# Patient Record
Sex: FEMALE | Race: WHITE | NOT HISPANIC OR LATINO | URBAN - METROPOLITAN AREA
[De-identification: names, ages, dates, MRNs, and addresses within clinical notes are randomized per-mention and may not be internally consistent; named-entity substitution may affect disease eponyms.]

---

## 2017-05-12 ENCOUNTER — ON CAMPUS - OUTPATIENT (OUTPATIENT)
Dept: URBAN - METROPOLITAN AREA HOSPITAL 108 | Facility: HOSPITAL | Age: 62
End: 2017-05-12

## 2017-05-12 DIAGNOSIS — R10.13 EPIGASTRIC PAIN: ICD-10-CM

## 2017-05-12 DIAGNOSIS — K21.9 GASTRO-ESOPHAGEAL REFLUX DISEASE WITHOUT ESOPHAGITIS: ICD-10-CM

## 2017-05-12 DIAGNOSIS — R07.89 OTHER CHEST PAIN: ICD-10-CM

## 2017-05-12 PROCEDURE — 99205 OFFICE O/P NEW HI 60 MIN: CPT | Performed by: PHYSICIAN ASSISTANT

## 2017-05-13 ENCOUNTER — INPATIENT HOSPITAL (OUTPATIENT)
Dept: URBAN - METROPOLITAN AREA HOSPITAL 107 | Facility: HOSPITAL | Age: 62
End: 2017-05-13

## 2017-05-13 DIAGNOSIS — K44.9 DIAPHRAGMATIC HERNIA WITHOUT OBSTRUCTION OR GANGRENE: ICD-10-CM

## 2017-05-13 DIAGNOSIS — R07.9 CHEST PAIN, UNSPECIFIED: ICD-10-CM

## 2017-05-13 DIAGNOSIS — K21.9 GASTRO-ESOPHAGEAL REFLUX DISEASE WITHOUT ESOPHAGITIS: ICD-10-CM

## 2017-05-13 DIAGNOSIS — K29.70 GASTRITIS, UNSPECIFIED, WITHOUT BLEEDING: ICD-10-CM

## 2017-05-13 DIAGNOSIS — R10.13 EPIGASTRIC PAIN: ICD-10-CM

## 2017-05-13 PROCEDURE — 43239 EGD BIOPSY SINGLE/MULTIPLE: CPT | Performed by: INTERNAL MEDICINE

## 2018-04-17 ENCOUNTER — INPATIENT HOSPITAL (OUTPATIENT)
Dept: URBAN - METROPOLITAN AREA HOSPITAL 107 | Facility: HOSPITAL | Age: 63
End: 2018-04-17

## 2018-04-17 DIAGNOSIS — R94.5 ABNORMAL RESULTS OF LIVER FUNCTION STUDIES: ICD-10-CM

## 2018-04-17 DIAGNOSIS — R12 HEARTBURN: ICD-10-CM

## 2018-04-17 DIAGNOSIS — R19.5 OTHER FECAL ABNORMALITIES: ICD-10-CM

## 2018-04-17 PROCEDURE — 99223 1ST HOSP IP/OBS HIGH 75: CPT | Performed by: INTERNAL MEDICINE

## 2018-04-18 ENCOUNTER — INPATIENT HOSPITAL (OUTPATIENT)
Dept: URBAN - METROPOLITAN AREA HOSPITAL 107 | Facility: HOSPITAL | Age: 63
End: 2018-04-18

## 2018-04-18 DIAGNOSIS — K62.5 HEMORRHAGE OF ANUS AND RECTUM: ICD-10-CM

## 2018-04-18 DIAGNOSIS — R94.5 ABNORMAL RESULTS OF LIVER FUNCTION STUDIES: ICD-10-CM

## 2018-04-18 DIAGNOSIS — K21.9 GASTRO-ESOPHAGEAL REFLUX DISEASE WITHOUT ESOPHAGITIS: ICD-10-CM

## 2018-04-18 PROCEDURE — 99231 SBSQ HOSP IP/OBS SF/LOW 25: CPT | Performed by: PHYSICIAN ASSISTANT

## 2018-05-16 ENCOUNTER — OFFICE (OUTPATIENT)
Dept: URBAN - METROPOLITAN AREA CLINIC 75 | Facility: CLINIC | Age: 63
End: 2018-05-16

## 2018-05-16 VITALS
DIASTOLIC BLOOD PRESSURE: 74 MMHG | SYSTOLIC BLOOD PRESSURE: 130 MMHG | HEIGHT: 65 IN | WEIGHT: 144 LBS | HEART RATE: 57 BPM

## 2018-05-16 DIAGNOSIS — K21.9 GASTRO-ESOPHAGEAL REFLUX DISEASE WITHOUT ESOPHAGITIS: ICD-10-CM

## 2018-05-16 DIAGNOSIS — K76.0 FATTY (CHANGE OF) LIVER, NOT ELSEWHERE CLASSIFIED: ICD-10-CM

## 2018-05-16 DIAGNOSIS — Z12.11 ENCOUNTER FOR SCREENING FOR MALIGNANT NEOPLASM OF COLON: ICD-10-CM

## 2018-05-16 DIAGNOSIS — R13.10 DYSPHAGIA, UNSPECIFIED: ICD-10-CM

## 2018-05-16 DIAGNOSIS — K22.70 BARRETT'S ESOPHAGUS WITHOUT DYSPLASIA: ICD-10-CM

## 2018-05-16 PROCEDURE — 99214 OFFICE O/P EST MOD 30 MIN: CPT | Performed by: NURSE PRACTITIONER

## 2018-05-16 RX ORDER — OMEPRAZOLE 40 MG/1
40 CAPSULE, DELAYED RELEASE ORAL
Qty: 30 | Refills: 6 | Status: COMPLETED
Start: 2018-05-16 | End: 2022-04-01

## 2019-07-19 ENCOUNTER — OFFICE (OUTPATIENT)
Dept: URBAN - METROPOLITAN AREA CLINIC 75 | Facility: CLINIC | Age: 64
End: 2019-07-19

## 2019-07-19 VITALS
HEART RATE: 80 BPM | DIASTOLIC BLOOD PRESSURE: 78 MMHG | HEIGHT: 65 IN | WEIGHT: 154 LBS | SYSTOLIC BLOOD PRESSURE: 138 MMHG

## 2019-07-19 DIAGNOSIS — K22.70 BARRETT'S ESOPHAGUS WITHOUT DYSPLASIA: ICD-10-CM

## 2019-07-19 DIAGNOSIS — K76.0 FATTY (CHANGE OF) LIVER, NOT ELSEWHERE CLASSIFIED: ICD-10-CM

## 2019-07-19 DIAGNOSIS — Z79.01 LONG TERM (CURRENT) USE OF ANTICOAGULANTS: ICD-10-CM

## 2019-07-19 DIAGNOSIS — K21.9 GASTRO-ESOPHAGEAL REFLUX DISEASE WITHOUT ESOPHAGITIS: ICD-10-CM

## 2019-07-19 DIAGNOSIS — K59.00 CONSTIPATION, UNSPECIFIED: ICD-10-CM

## 2019-07-19 DIAGNOSIS — Z12.11 ENCOUNTER FOR SCREENING FOR MALIGNANT NEOPLASM OF COLON: ICD-10-CM

## 2019-07-19 DIAGNOSIS — Z86.010 PERSONAL HISTORY OF COLONIC POLYPS: ICD-10-CM

## 2019-07-19 PROCEDURE — 99214 OFFICE O/P EST MOD 30 MIN: CPT | Performed by: NURSE PRACTITIONER

## 2019-07-19 RX ORDER — DEXLANSOPRAZOLE 60 MG/1
120 CAPSULE, DELAYED RELEASE ORAL
Qty: 180 | Refills: 3 | Status: ACTIVE
Start: 2019-07-19

## 2021-04-09 ENCOUNTER — OFFICE (OUTPATIENT)
Dept: URBAN - METROPOLITAN AREA CLINIC 75 | Facility: CLINIC | Age: 66
End: 2021-04-09

## 2021-04-09 VITALS
DIASTOLIC BLOOD PRESSURE: 76 MMHG | SYSTOLIC BLOOD PRESSURE: 116 MMHG | TEMPERATURE: 97.1 F | HEART RATE: 66 BPM | OXYGEN SATURATION: 97 % | HEIGHT: 65 IN | WEIGHT: 164 LBS

## 2021-04-09 DIAGNOSIS — K22.70 BARRETT'S ESOPHAGUS WITHOUT DYSPLASIA: ICD-10-CM

## 2021-04-09 DIAGNOSIS — K59.00 CONSTIPATION, UNSPECIFIED: ICD-10-CM

## 2021-04-09 DIAGNOSIS — R74.8 ABNORMAL LEVELS OF OTHER SERUM ENZYMES: ICD-10-CM

## 2021-04-09 DIAGNOSIS — Z86.010 PERSONAL HISTORY OF COLONIC POLYPS: ICD-10-CM

## 2021-04-09 DIAGNOSIS — K21.9 GASTRO-ESOPHAGEAL REFLUX DISEASE WITHOUT ESOPHAGITIS: ICD-10-CM

## 2021-04-09 PROCEDURE — 99214 OFFICE O/P EST MOD 30 MIN: CPT | Performed by: NURSE PRACTITIONER

## 2021-04-09 RX ORDER — DEXLANSOPRAZOLE 60 MG/1
120 CAPSULE, DELAYED RELEASE ORAL
Qty: 180 | Refills: 3 | Status: ACTIVE
Start: 2019-07-19

## 2021-10-21 ENCOUNTER — OFFICE VISIT (OUTPATIENT)
Dept: FAMILY MEDICINE CLINIC | Facility: CLINIC | Age: 66
End: 2021-10-21

## 2021-10-21 VITALS
OXYGEN SATURATION: 97 % | WEIGHT: 165 LBS | HEIGHT: 65 IN | BODY MASS INDEX: 27.49 KG/M2 | HEART RATE: 64 BPM | DIASTOLIC BLOOD PRESSURE: 88 MMHG | SYSTOLIC BLOOD PRESSURE: 152 MMHG

## 2021-10-21 DIAGNOSIS — E11.65 TYPE 2 DIABETES MELLITUS WITH HYPERGLYCEMIA, WITHOUT LONG-TERM CURRENT USE OF INSULIN (HCC): ICD-10-CM

## 2021-10-21 DIAGNOSIS — K21.9 GASTROESOPHAGEAL REFLUX DISEASE, UNSPECIFIED WHETHER ESOPHAGITIS PRESENT: ICD-10-CM

## 2021-10-21 DIAGNOSIS — Z23 INFLUENZA VACCINE NEEDED: ICD-10-CM

## 2021-10-21 DIAGNOSIS — E78.2 MIXED HYPERLIPIDEMIA: ICD-10-CM

## 2021-10-21 DIAGNOSIS — Z23 NEED FOR PNEUMOCOCCAL VACCINE: Primary | ICD-10-CM

## 2021-10-21 DIAGNOSIS — E03.9 HYPOTHYROIDISM, UNSPECIFIED TYPE: ICD-10-CM

## 2021-10-21 DIAGNOSIS — I10 ESSENTIAL HYPERTENSION: ICD-10-CM

## 2021-10-21 DIAGNOSIS — Z78.0 POSTMENOPAUSAL: ICD-10-CM

## 2021-10-21 PROCEDURE — 90686 IIV4 VACC NO PRSV 0.5 ML IM: CPT

## 2021-10-21 PROCEDURE — 99204 OFFICE O/P NEW MOD 45 MIN: CPT

## 2021-10-21 PROCEDURE — G0008 ADMIN INFLUENZA VIRUS VAC: HCPCS

## 2021-10-21 RX ORDER — ISOSORBIDE MONONITRATE 30 MG/1
30 TABLET, EXTENDED RELEASE ORAL DAILY
COMMUNITY

## 2021-10-21 RX ORDER — ASPIRIN 81 MG/1
81 TABLET ORAL DAILY
COMMUNITY

## 2021-10-21 RX ORDER — LEVOTHYROXINE SODIUM 0.07 MG/1
75 TABLET ORAL DAILY
COMMUNITY
End: 2022-01-25 | Stop reason: SDUPTHER

## 2021-10-21 RX ORDER — ALIROCUMAB 75 MG/ML
INJECTION, SOLUTION SUBCUTANEOUS
COMMUNITY
End: 2022-01-03 | Stop reason: SDUPTHER

## 2021-10-21 RX ORDER — DEXLANSOPRAZOLE 60 MG/1
60 CAPSULE, DELAYED RELEASE ORAL DAILY
COMMUNITY

## 2021-10-21 RX ORDER — METOPROLOL SUCCINATE 25 MG/1
25 TABLET, EXTENDED RELEASE ORAL DAILY
COMMUNITY
End: 2022-01-03 | Stop reason: SDUPTHER

## 2021-10-21 NOTE — PROGRESS NOTES
"Subjective   Gerda Bolden is a 65 y.o. female. Presents today as a New patient here to establish care and f/u on DM    Chief Complaint   Patient presents with   • Annual Exam       History of Present Illness    Patient here as a new patient establish care  States he used to see primary care provider at [facility] on [address] but does not want to go there anymore.  Hx. HTN, HLD, CAD, GERD, Hypothyroidism and hard of hearing.     CAD  Previous history of chest pains with consequent heart cath and stenting x2 on 8/2016 and 12/2016.  Sees cardiologist Dr. Arnett. Per records, cont to have chest pains and underwent another heart cath in 2018   Was on blood thinners but not anymore. Is taking daily aspirin.   Denies recent chest pain, shortness of breath, edema, palpitations, dizziness.  Last saw cardiologist in 3/21. States will see cardiologist next month.  No hx MI/CVA     HLD  Hx of elevated cholesterol, LDL and liver enzymes. Did not tolerate statins and was placed on Praluent which states is tolerating well.   Lipids from 4/07/21- Triglycerides> 192; Cholesterol> 247; LDL> 157; HDL> 51    Hypothyroidism  Also history of hypothyroidism.  Takes levothyroxine with no issues.  Denies any weight changes.     DM  States was told a month ago that she had diabetes.    Was a started on Metformin 500 twice a day.  Has been taking for couple weeks no side effects.  Does complain of polyphagia, denies polydipsia, and polyuria.  Does have occasional tingling and burning and warmth on bottom of feet. Denies feeling dizzy, clammy.   States unhealthy diet. Poor dietary choices including fast foods and a \"lot of soda\" drinks.   Has equipment at home to check sugars and has been noting BG in 200s.   No recent diabetic eye exam. No vision changes  Recent A1c on 9/16/2021> 7.2- Last a1c in 2019> 5.6    GERD  On Dexilant, tolerating well.  Sees GI- Dr Ryan Flowers for NAFLD  Colonoscopy will do with GI- has a liver US coming up soon as " well.       Hx of postmenopausal vag bleeding  Underwent endometrial biopsy & hysteroscopy and cervical mass removal in 2018 w Dr. Mcdonald with [facility]  Has not had follow ups.   Denies further vag bleeding, pelvic pain.     HM  Mammogram last week- DXP - normal   Covid vaccine UTD  CRC screen- will get with GI   DEXA scan- will order today  Needs Flu vaccine today  Defers Pneumococcal vaccine- states had recently    Factor V family Hx- wants screened- will address next appointment with labs.       Review of Systems   Constitutional: Positive for fatigue. Negative for appetite change, fever and unexpected weight change.   Eyes: Negative for visual disturbance.   Respiratory: Negative.    Cardiovascular: Negative.    Gastrointestinal: Negative.    Endocrine: Negative.    Musculoskeletal: Negative.        There is no problem list on file for this patient.    Past Medical History:   Diagnosis Date   • Diabetes mellitus (HCC)    • GERD (gastroesophageal reflux disease)    • Hyperlipidemia    • Hypertension    • Hypothyroidism      Past Surgical History:   Procedure Laterality Date   • BREAST SURGERY Right    • CARDIAC CATHETERIZATION     • DILATATION AND CURETTAGE       Family History   Problem Relation Age of Onset   • No Known Problems Mother    • Cancer Sister      Social History     Socioeconomic History   • Marital status:    Tobacco Use   • Smoking status: Former Smoker     Packs/day: 1.50     Types: Cigarettes     Quit date: 2017     Years since quittin.8   • Smokeless tobacco: Never Used   Vaping Use   • Vaping Use: Never used   Substance and Sexual Activity   • Alcohol use: Never   • Drug use: Never   • Sexual activity: Defer       Allergies   Allergen Reactions   • Statins Other (See Comments)     Muscle aches       Current Outpatient Medications on File Prior to Visit   Medication Sig Dispense Refill   • Alirocumab (Praluent) 75 MG/ML solution auto-injector Inject  under the skin into the  "appropriate area as directed.     • aspirin 81 MG EC tablet Take 81 mg by mouth Daily.     • dexlansoprazole (DEXILANT) 60 MG capsule Take 60 mg by mouth Daily.     • isosorbide mononitrate (IMDUR) 30 MG 24 hr tablet Take 30 mg by mouth Daily.     • levothyroxine (SYNTHROID, LEVOTHROID) 75 MCG tablet Take 75 mcg by mouth Daily.     • metFORMIN (GLUCOPHAGE) 500 MG tablet Take 500 mg by mouth 2 (Two) Times a Day With Meals.     • metoprolol succinate XL (TOPROL-XL) 25 MG 24 hr tablet Take 25 mg by mouth Daily.       No current facility-administered medications on file prior to visit.       Objective   Vitals:    10/21/21 1305   BP: 152/88   Pulse: 64   SpO2: 97%   Weight: 74.8 kg (165 lb)   Height: 163.8 cm (64.5\")     Body mass index is 27.88 kg/m².  Physical Exam  Constitutional:       Appearance: Normal appearance.   HENT:      Head: Normocephalic and atraumatic.      Right Ear: Decreased hearing noted.      Left Ear: Decreased hearing noted.   Neck:      Thyroid: No thyroid mass, thyromegaly or thyroid tenderness.   Cardiovascular:      Rate and Rhythm: Normal rate and regular rhythm.      Pulses: Normal pulses.      Heart sounds: Normal heart sounds. No murmur heard.      Pulmonary:      Effort: Pulmonary effort is normal.      Breath sounds: Normal breath sounds.   Musculoskeletal:      Right lower leg: No edema.      Left lower leg: No edema.   Lymphadenopathy:      Cervical: No cervical adenopathy.   Neurological:      General: No focal deficit present.      Mental Status: She is alert and oriented to person, place, and time.   Psychiatric:         Mood and Affect: Mood normal.         Behavior: Behavior normal.         Thought Content: Thought content normal.         Judgment: Judgment normal.         Assessment/Plan   Diagnoses and all orders for this visit:      1. Type 2 diabetes mellitus with hyperglycemia, without long-term current use of insulin (HCC)       - Continue Metformin       - Labs not due- " will check in December (3 months after last     A1c)        - Advised to get an eye exam ASAP    2. Influenza vaccine needed      -     FluLaval/Fluarix/Fluzone >6 Months (6300-3013)    3. Postmenopausal  -     DEXA Bone Density Axial    4. Essential hypertension      - BP not at goal today.       - Work on a healthy diet.  Limit salt and fatty foods.  Add  exercise to her routine.  Recommendation is 150 minutes  of moderate exercise per week if you cannot do that, at  least walk 30 minutes 3-4 times per week.       - Has appointment with cardiology next month.       - If you start having chest pain severe headaches shortness o breath or dizziness go to the ED.    6. Hypothyroidism, unspecified type      - last TSH in 4/21 1.40     - cont current tx.     7. Gastroesophageal reflux disease, unspecified whether esophagitis present     - stable. Cont current tx.     8. Mixed hyperlipidemia    - Lipids on 4/21 elevated    - Advised on healthy diet.    - wants to discuss further recommendations w cardiology     HM  Mammogram last week- DXP - normal   Covid vaccine UTD  CRC screen- will get with GI   DEXA scan- will order today  Needs Flu vaccine today  Defers Pneumococcal vaccine- states had recently    Factor V family Hx- wants screened- will address next appointment with labs.        -Follow up: In December 2021.    This document is intended for medical expert use only. Reading of this document by patients and/or patient's family without participating medical staff guidance may result in misinterpretation and unintended morbidity.  Any interpretation of such data is the responsibility of the patient and/or family member responsible for the patient in concert with their primary or specialist providers, not to be left for sources of online searches such as GetMyRx, [employer] or similar queries. Relying on these approaches to knowledge may result in misinterpretation, misguided goals of care and even death should patients or  family members try recommendations outside of the realm of professional medical care in a supervised way.     Please allow 3-5 business days for recommendations based on new results     Go to the ER for any possible lifethreatening symptoms such as chest pain or shortness of air.      I personally spent 45 minutes reviewing the chart before the visit, time with the patient, and time documenting the visit.    Dictated utilizing Dragon dictation:  Much of this encounter note is an electronic transcription/translation of spoken language to printed text. The electronic translation of spoken language may permit erroneous, or at times, nonsensical words or phrases to be inadvertently transcribed; Although I have reviewed the note for such errors, some may still exist.

## 2022-01-03 ENCOUNTER — OFFICE VISIT (OUTPATIENT)
Dept: FAMILY MEDICINE CLINIC | Facility: CLINIC | Age: 67
End: 2022-01-03

## 2022-01-03 VITALS
BODY MASS INDEX: 27.31 KG/M2 | WEIGHT: 160 LBS | SYSTOLIC BLOOD PRESSURE: 142 MMHG | OXYGEN SATURATION: 97 % | DIASTOLIC BLOOD PRESSURE: 74 MMHG | HEIGHT: 64 IN | HEART RATE: 60 BPM

## 2022-01-03 DIAGNOSIS — E11.65 TYPE 2 DIABETES MELLITUS WITH HYPERGLYCEMIA, WITHOUT LONG-TERM CURRENT USE OF INSULIN: ICD-10-CM

## 2022-01-03 DIAGNOSIS — I10 ESSENTIAL HYPERTENSION: Primary | ICD-10-CM

## 2022-01-03 DIAGNOSIS — Z11.59 NEED FOR HEPATITIS C SCREENING TEST: ICD-10-CM

## 2022-01-03 DIAGNOSIS — R25.2 MUSCLE CRAMPS: ICD-10-CM

## 2022-01-03 DIAGNOSIS — E03.9 HYPOTHYROIDISM, UNSPECIFIED TYPE: ICD-10-CM

## 2022-01-03 DIAGNOSIS — E78.2 MIXED HYPERLIPIDEMIA: ICD-10-CM

## 2022-01-03 PROCEDURE — 99214 OFFICE O/P EST MOD 30 MIN: CPT

## 2022-01-03 RX ORDER — ALIROCUMAB 75 MG/ML
INJECTION, SOLUTION SUBCUTANEOUS
COMMUNITY
End: 2022-01-20 | Stop reason: SDUPTHER

## 2022-01-03 RX ORDER — METOPROLOL TARTRATE 50 MG/1
TABLET, FILM COATED ORAL
COMMUNITY
Start: 2021-11-30 | End: 2022-01-03

## 2022-01-03 RX ORDER — METOPROLOL SUCCINATE 25 MG/1
25 TABLET, EXTENDED RELEASE ORAL DAILY
COMMUNITY

## 2022-01-03 NOTE — PROGRESS NOTES
"Subjective   Gerda Bolden is a 66 y.o. female. Who presents to follow up      History of Present Illness     Saw cardiology in November and recommended a CT heart - test borderline abnormal and pt to see them in 2 days to discuss results and further recommendations- possibly needs PET scan or heart cath.   Pt Still having chest pains- intermittent and happen mostly when cooking or doing chores but sometimes at rest. Last 1-2 secs with no associated symptoms. Unchanged from prior episodes.   Cont to take praulent, toprol and imdur as prescribed with no issues.     HTN  BP cont to be elevated  Pt taking Metoprolol and Imdur per cardio  Was placed on Lisinopril in the past but states dropped her BP sto stopped taking  No HA, Leg swelling, dizziness    Has been having some gral muscle cramps for the last few weeks, mostly in legs. They are intermittent. Has been trying to increase water intake.     Had some moles removed by derm on Thursday- mostly in chest area. States was told these were benign. Has been having some pain in one of the spots. Was told to apply Vaseline. No erythema, drainage, fever.     The following portions of the patient's history were reviewed and updated as appropriate: allergies, current medications, past family history, past medical history, past social history and past surgical history.    Review of Systems   Constitutional: Negative for chills, fatigue and fever.   Eyes: Negative for visual disturbance.   Respiratory: Negative.    Cardiovascular: Positive for chest pain. Negative for palpitations and leg swelling.   Gastrointestinal: Negative for constipation and diarrhea.   Endocrine: Negative.    Skin: Positive for skin lesions.   Neurological: Negative for dizziness and headache.   Psychiatric/Behavioral: Negative for sleep disturbance.       Objective    /74   Pulse 60   Ht 163.8 cm (64.49\")   Wt 72.6 kg (160 lb)   SpO2 97%   BMI 27.05 kg/m²     Physical Exam  Constitutional:  "      Appearance: Normal appearance.   Eyes:      Pupils: Pupils are equal, round, and reactive to light.   Neck:      Vascular: No carotid bruit.   Cardiovascular:      Rate and Rhythm: Normal rate.      Pulses: Normal pulses.           Carotid pulses are 2+ on the right side and 2+ on the left side.     Heart sounds: Normal heart sounds, S1 normal and S2 normal. No murmur heard.      Pulmonary:      Effort: Pulmonary effort is normal. No respiratory distress.      Breath sounds: Normal breath sounds.   Musculoskeletal:      Right lower leg: No edema.      Left lower leg: No edema.   Skin:         Neurological:      General: No focal deficit present.      Mental Status: She is alert and oriented to person, place, and time.   Psychiatric:         Mood and Affect: Mood normal.         Behavior: Behavior normal.         Thought Content: Thought content normal.         Judgment: Judgment normal.       Assessment/Plan   Diagnoses and all orders for this visit:    1. Essential hypertension (Primary)  -     CBC & Differential  -     Lipid Panel  -     Pt has lisinopril 10 mg at home. Advised to start daily and check BP BID and call if having low BP.   -     Pt has appointment with cardiology coming up and will also discuss with them.     2. Type 2 diabetes mellitus with hyperglycemia, without long-term current use of insulin (HCC)  -     Comprehensive Metabolic Panel  -     Hemoglobin A1c  -     Microalbumin / Creatinine Urine Ratio - Urine, Clean Catch    3. Hypothyroidism, unspecified type  -     TSH    4. Mixed hyperlipidemia  -     Lipid Panel    5. Muscle cramps  -     Comprehensive Metabolic Panel  -     Magnesium  -     Increase water intake.     6. Need for hepatitis C screening test  -     Hepatitis C Antibody      Wounds: Advised to keep wounds clean (soap and water) and apply Vaseline as needed. May take tylenol for pain. If pain persists or signs of infection contact derm.     Pt still to follow up with GI for  liver US and CRC screen. States will make appointment soon.     Follow up in 6 months or sooner as needed.      This document is intended for medical expert use only. Reading of this document by patients and/or patient's family without participating medical staff guidance may result in misinterpretation and unintended morbidity.  Any interpretation of such data is the responsibility of the patient and/or family member responsible for the patient in concert with their primary or specialist providers, not to be left for sources of online searches such as [employer], [employer] or similar queries. Relying on these approaches to knowledge may result in misinterpretation, misguided goals of care and even death should patients or family members try recommendations outside of the realm of professional medical care in a supervised way.     Please allow 3-5 business days for recommendations based on new results     Go to the ER for any possible lifethreatening symptoms such as chest pain or shortness of air.      I personally spent 30 minutes with this patient, preparing for the visit, reviewing tests, obtaining and/or reviewing a separately obtained history, performing a medically appropriate examination and/or evaluation , counseling and educating the patient/family/caregiver, ordering medications, tests, or procedures, documenting information in the medical record and independently interpreting results.

## 2022-01-04 DIAGNOSIS — K76.0 FATTY LIVER: Primary | ICD-10-CM

## 2022-01-04 LAB
ALBUMIN SERPL-MCNC: 4.8 G/DL (ref 3.8–4.8)
ALBUMIN/CREAT UR: <8 MG/G CREAT (ref 0–29)
ALBUMIN/GLOB SERPL: 1.7 {RATIO} (ref 1.2–2.2)
ALP SERPL-CCNC: 112 IU/L (ref 44–121)
ALT SERPL-CCNC: 120 IU/L (ref 0–32)
AST SERPL-CCNC: 105 IU/L (ref 0–40)
BASOPHILS # BLD AUTO: 0 X10E3/UL (ref 0–0.2)
BASOPHILS NFR BLD AUTO: 1 %
BILIRUB SERPL-MCNC: 0.6 MG/DL (ref 0–1.2)
BUN SERPL-MCNC: 10 MG/DL (ref 8–27)
BUN/CREAT SERPL: 11 (ref 12–28)
CALCIUM SERPL-MCNC: 10.4 MG/DL (ref 8.7–10.3)
CHLORIDE SERPL-SCNC: 100 MMOL/L (ref 96–106)
CHOLEST SERPL-MCNC: 181 MG/DL (ref 100–199)
CO2 SERPL-SCNC: 25 MMOL/L (ref 20–29)
CREAT SERPL-MCNC: 0.9 MG/DL (ref 0.57–1)
CREAT UR-MCNC: 37.9 MG/DL
EOSINOPHIL # BLD AUTO: 0.1 X10E3/UL (ref 0–0.4)
EOSINOPHIL NFR BLD AUTO: 2 %
ERYTHROCYTE [DISTWIDTH] IN BLOOD BY AUTOMATED COUNT: 12 % (ref 11.7–15.4)
GLOBULIN SER CALC-MCNC: 2.9 G/DL (ref 1.5–4.5)
GLUCOSE SERPL-MCNC: 217 MG/DL (ref 65–99)
HBA1C MFR BLD: 5.9 % (ref 4.8–5.6)
HCT VFR BLD AUTO: 45 % (ref 34–46.6)
HCV AB S/CO SERPL IA: <0.1 S/CO RATIO (ref 0–0.9)
HDLC SERPL-MCNC: 68 MG/DL
HGB BLD-MCNC: 15.1 G/DL (ref 11.1–15.9)
IMM GRANULOCYTES # BLD AUTO: 0.1 X10E3/UL (ref 0–0.1)
IMM GRANULOCYTES NFR BLD AUTO: 1 %
LDLC SERPL CALC-MCNC: 83 MG/DL (ref 0–99)
LYMPHOCYTES # BLD AUTO: 2 X10E3/UL (ref 0.7–3.1)
LYMPHOCYTES NFR BLD AUTO: 27 %
MAGNESIUM SERPL-MCNC: 2.2 MG/DL (ref 1.6–2.3)
MCH RBC QN AUTO: 31.8 PG (ref 26.6–33)
MCHC RBC AUTO-ENTMCNC: 33.6 G/DL (ref 31.5–35.7)
MCV RBC AUTO: 95 FL (ref 79–97)
MICROALBUMIN UR-MCNC: <3 UG/ML
MONOCYTES # BLD AUTO: 0.4 X10E3/UL (ref 0.1–0.9)
MONOCYTES NFR BLD AUTO: 5 %
NEUTROPHILS # BLD AUTO: 4.7 X10E3/UL (ref 1.4–7)
NEUTROPHILS NFR BLD AUTO: 64 %
PLATELET # BLD AUTO: 239 X10E3/UL (ref 150–450)
POTASSIUM SERPL-SCNC: 3.7 MMOL/L (ref 3.5–5.2)
PROT SERPL-MCNC: 7.7 G/DL (ref 6–8.5)
RBC # BLD AUTO: 4.75 X10E6/UL (ref 3.77–5.28)
SODIUM SERPL-SCNC: 141 MMOL/L (ref 134–144)
TRIGL SERPL-MCNC: 182 MG/DL (ref 0–149)
TSH SERPL DL<=0.005 MIU/L-ACNC: 1.85 UIU/ML (ref 0.45–4.5)
VLDLC SERPL CALC-MCNC: 30 MG/DL (ref 5–40)
WBC # BLD AUTO: 7.3 X10E3/UL (ref 3.4–10.8)

## 2022-01-04 RX ORDER — VITAMIN E 268 MG
400 CAPSULE ORAL DAILY
Qty: 90 CAPSULE | Refills: 1 | Status: SHIPPED | OUTPATIENT
Start: 2022-01-04 | End: 2022-07-03

## 2022-01-04 NOTE — PROGRESS NOTES
Please inform pt of abnormal lab results.   Her liver enzymes are elevated (worse than 10 months ago) and its very important that she follows up with GI provider as soon as possible. I have sent Vitamin E supplement to her pharmacy which has shown some benefit in fatty liver. Take one daily.   Cholesterol is improved but triglycerides are elevated which is related to diet. Work on a healthy diet.  Limit salt, sugar and fatty foods.  Add exercise to your routine as discussed.   A1c at prediabetic range. Continue metformin for now but will need to watch labs as could affect liver.     Follow up in 3 months to check liver enzymes.

## 2022-01-20 ENCOUNTER — TELEPHONE (OUTPATIENT)
Dept: FAMILY MEDICINE CLINIC | Facility: CLINIC | Age: 67
End: 2022-01-20

## 2022-01-20 DIAGNOSIS — E78.2 MIXED HYPERLIPIDEMIA: Primary | ICD-10-CM

## 2022-01-20 RX ORDER — ALIROCUMAB 75 MG/ML
75 INJECTION, SOLUTION SUBCUTANEOUS
Qty: 2.24 ML | Refills: 2 | Status: SHIPPED | OUTPATIENT
Start: 2022-01-20 | End: 2022-01-25 | Stop reason: SDUPTHER

## 2022-01-20 RX ORDER — ALIROCUMAB 75 MG/ML
INJECTION, SOLUTION SUBCUTANEOUS
Qty: 2.24 ML | OUTPATIENT
Start: 2022-01-20

## 2022-01-20 NOTE — TELEPHONE ENCOUNTER
Looks like cardiology refilled on 1/3/22. He prescribed this for her. Please ask to request refill from them and call us if issues.

## 2022-01-20 NOTE — TELEPHONE ENCOUNTER
Pt said her old PCP refilled it for her. Pt said she only has 2 doses left and is requesting you refill. Please advise or send med to pharmacy.

## 2022-01-20 NOTE — TELEPHONE ENCOUNTER
Caller: Gerda Bolden    Relationship: Self    Requested Prescriptions:   Requested Prescriptions     Pending Prescriptions Disp Refills   • Alirocumab (Praluent) 75 MG/ML solution auto-injector 2.24 mL      Sig: Inject  under the skin into the appropriate area as directed.        Pharmacy where request should be sent: [facility] [place] [id number] [facility] - [phone] [facility] [phone] FX

## 2022-01-25 DIAGNOSIS — E78.2 MIXED HYPERLIPIDEMIA: ICD-10-CM

## 2022-01-25 DIAGNOSIS — E03.9 HYPOTHYROIDISM, UNSPECIFIED TYPE: Primary | ICD-10-CM

## 2022-01-25 RX ORDER — LEVOTHYROXINE SODIUM 0.07 MG/1
75 TABLET ORAL DAILY
Qty: 90 TABLET | Refills: 1 | Status: SHIPPED | OUTPATIENT
Start: 2022-01-25 | End: 2022-09-09 | Stop reason: SDUPTHER

## 2022-01-25 RX ORDER — ALIROCUMAB 75 MG/ML
75 INJECTION, SOLUTION SUBCUTANEOUS
Qty: 2.24 ML | Refills: 2 | Status: SHIPPED | OUTPATIENT
Start: 2022-01-25 | End: 2023-02-26 | Stop reason: SDUPTHER

## 2022-04-01 ENCOUNTER — OFFICE (OUTPATIENT)
Dept: URBAN - METROPOLITAN AREA CLINIC 75 | Facility: CLINIC | Age: 67
End: 2022-04-01

## 2022-04-01 VITALS
OXYGEN SATURATION: 99 % | DIASTOLIC BLOOD PRESSURE: 84 MMHG | HEART RATE: 70 BPM | WEIGHT: 160 LBS | SYSTOLIC BLOOD PRESSURE: 121 MMHG | HEIGHT: 65 IN

## 2022-04-01 DIAGNOSIS — Z86.010 PERSONAL HISTORY OF COLONIC POLYPS: ICD-10-CM

## 2022-04-01 DIAGNOSIS — R13.10 DYSPHAGIA, UNSPECIFIED: ICD-10-CM

## 2022-04-01 DIAGNOSIS — K22.70 BARRETT'S ESOPHAGUS WITHOUT DYSPLASIA: ICD-10-CM

## 2022-04-01 DIAGNOSIS — R94.5 ABNORMAL RESULTS OF LIVER FUNCTION STUDIES: ICD-10-CM

## 2022-04-01 DIAGNOSIS — K76.0 FATTY (CHANGE OF) LIVER, NOT ELSEWHERE CLASSIFIED: ICD-10-CM

## 2022-04-01 DIAGNOSIS — K21.9 GASTRO-ESOPHAGEAL REFLUX DISEASE WITHOUT ESOPHAGITIS: ICD-10-CM

## 2022-04-01 PROCEDURE — 99214 OFFICE O/P EST MOD 30 MIN: CPT | Performed by: NURSE PRACTITIONER

## 2022-04-01 RX ORDER — DEXLANSOPRAZOLE 60 MG/1
120 CAPSULE, DELAYED RELEASE ORAL
Qty: 180 | Refills: 3 | Status: ACTIVE
Start: 2019-07-19

## 2022-04-01 RX ORDER — FAMOTIDINE 40 MG/1
40 TABLET, FILM COATED ORAL
Qty: 90 | Refills: 4 | Status: ACTIVE
Start: 2022-04-01

## 2022-04-18 ENCOUNTER — HOSPITAL ENCOUNTER (OUTPATIENT)
Dept: BONE DENSITY | Facility: HOSPITAL | Age: 67
End: 2022-04-18

## 2022-04-22 VITALS
HEART RATE: 93 BPM | OXYGEN SATURATION: 97 % | HEART RATE: 72 BPM | SYSTOLIC BLOOD PRESSURE: 132 MMHG | HEART RATE: 85 BPM | DIASTOLIC BLOOD PRESSURE: 77 MMHG | RESPIRATION RATE: 22 BRPM | HEART RATE: 90 BPM | SYSTOLIC BLOOD PRESSURE: 151 MMHG | HEART RATE: 64 BPM | HEART RATE: 66 BPM | SYSTOLIC BLOOD PRESSURE: 118 MMHG | OXYGEN SATURATION: 99 % | RESPIRATION RATE: 16 BRPM | OXYGEN SATURATION: 98 % | OXYGEN SATURATION: 100 % | DIASTOLIC BLOOD PRESSURE: 65 MMHG | DIASTOLIC BLOOD PRESSURE: 93 MMHG | SYSTOLIC BLOOD PRESSURE: 129 MMHG | DIASTOLIC BLOOD PRESSURE: 74 MMHG | WEIGHT: 155 LBS | OXYGEN SATURATION: 92 % | SYSTOLIC BLOOD PRESSURE: 191 MMHG | RESPIRATION RATE: 23 BRPM | RESPIRATION RATE: 10 BRPM | SYSTOLIC BLOOD PRESSURE: 124 MMHG | HEART RATE: 84 BPM | HEIGHT: 65 IN | SYSTOLIC BLOOD PRESSURE: 123 MMHG | DIASTOLIC BLOOD PRESSURE: 69 MMHG | DIASTOLIC BLOOD PRESSURE: 119 MMHG | RESPIRATION RATE: 17 BRPM | DIASTOLIC BLOOD PRESSURE: 62 MMHG | HEART RATE: 82 BPM | DIASTOLIC BLOOD PRESSURE: 91 MMHG | DIASTOLIC BLOOD PRESSURE: 67 MMHG | SYSTOLIC BLOOD PRESSURE: 153 MMHG | OXYGEN SATURATION: 96 % | RESPIRATION RATE: 32 BRPM | DIASTOLIC BLOOD PRESSURE: 72 MMHG | RESPIRATION RATE: 12 BRPM | DIASTOLIC BLOOD PRESSURE: 82 MMHG | DIASTOLIC BLOOD PRESSURE: 71 MMHG | TEMPERATURE: 97.7 F | HEART RATE: 83 BPM | SYSTOLIC BLOOD PRESSURE: 128 MMHG | SYSTOLIC BLOOD PRESSURE: 174 MMHG | OXYGEN SATURATION: 94 % | OXYGEN SATURATION: 95 % | SYSTOLIC BLOOD PRESSURE: 149 MMHG | HEART RATE: 89 BPM | HEART RATE: 68 BPM | RESPIRATION RATE: 15 BRPM | DIASTOLIC BLOOD PRESSURE: 79 MMHG | RESPIRATION RATE: 28 BRPM | SYSTOLIC BLOOD PRESSURE: 158 MMHG | SYSTOLIC BLOOD PRESSURE: 133 MMHG | HEART RATE: 87 BPM | HEART RATE: 91 BPM | SYSTOLIC BLOOD PRESSURE: 208 MMHG | DIASTOLIC BLOOD PRESSURE: 98 MMHG

## 2022-04-26 ENCOUNTER — OFFICE (OUTPATIENT)
Dept: URBAN - METROPOLITAN AREA PATHOLOGY 4 | Facility: PATHOLOGY | Age: 67
End: 2022-04-26

## 2022-04-26 ENCOUNTER — AMBULATORY SURGICAL CENTER (OUTPATIENT)
Dept: URBAN - METROPOLITAN AREA SURGERY 17 | Facility: SURGERY | Age: 67
End: 2022-04-26

## 2022-04-26 DIAGNOSIS — K31.89 OTHER DISEASES OF STOMACH AND DUODENUM: ICD-10-CM

## 2022-04-26 DIAGNOSIS — K57.30 DIVERTICULOSIS OF LARGE INTESTINE WITHOUT PERFORATION OR ABS: ICD-10-CM

## 2022-04-26 DIAGNOSIS — K76.0 FATTY (CHANGE OF) LIVER, NOT ELSEWHERE CLASSIFIED: ICD-10-CM

## 2022-04-26 DIAGNOSIS — Z86.010 PERSONAL HISTORY OF COLONIC POLYPS: ICD-10-CM

## 2022-04-26 DIAGNOSIS — R13.10 DYSPHAGIA, UNSPECIFIED: ICD-10-CM

## 2022-04-26 DIAGNOSIS — K44.9 DIAPHRAGMATIC HERNIA WITHOUT OBSTRUCTION OR GANGRENE: ICD-10-CM

## 2022-04-26 DIAGNOSIS — K63.5 POLYP OF COLON: ICD-10-CM

## 2022-04-26 DIAGNOSIS — D12.3 BENIGN NEOPLASM OF TRANSVERSE COLON: ICD-10-CM

## 2022-04-26 DIAGNOSIS — K29.70 GASTRITIS, UNSPECIFIED, WITHOUT BLEEDING: ICD-10-CM

## 2022-04-26 DIAGNOSIS — K21.9 GASTRO-ESOPHAGEAL REFLUX DISEASE WITHOUT ESOPHAGITIS: ICD-10-CM

## 2022-04-26 LAB
GI HISTOLOGY: A. UNSPECIFIED: (no result)
GI HISTOLOGY: B. UNSPECIFIED: (no result)
GI HISTOLOGY: C. UNSPECIFIED: (no result)
GI HISTOLOGY: D. UNSPECIFIED: (no result)
GI HISTOLOGY: E. UNSPECIFIED: (no result)
GI HISTOLOGY: F. UNSPECIFIED: (no result)
GI HISTOLOGY: PDF REPORT: (no result)

## 2022-04-26 PROCEDURE — 43239 EGD BIOPSY SINGLE/MULTIPLE: CPT | Performed by: INTERNAL MEDICINE

## 2022-04-26 PROCEDURE — 88305 TISSUE EXAM BY PATHOLOGIST: CPT | Performed by: INTERNAL MEDICINE

## 2022-04-26 PROCEDURE — 88342 IMHCHEM/IMCYTCHM 1ST ANTB: CPT | Performed by: INTERNAL MEDICINE

## 2022-04-26 PROCEDURE — 45385 COLONOSCOPY W/LESION REMOVAL: CPT | Mod: PT | Performed by: INTERNAL MEDICINE

## 2022-04-28 ENCOUNTER — OFFICE VISIT (OUTPATIENT)
Dept: FAMILY MEDICINE CLINIC | Facility: CLINIC | Age: 67
End: 2022-04-28

## 2022-04-28 VITALS
HEIGHT: 64 IN | OXYGEN SATURATION: 98 % | SYSTOLIC BLOOD PRESSURE: 138 MMHG | DIASTOLIC BLOOD PRESSURE: 68 MMHG | HEART RATE: 67 BPM | BODY MASS INDEX: 26.98 KG/M2 | WEIGHT: 158 LBS

## 2022-04-28 DIAGNOSIS — K76.9 LIVER DISEASE, UNSPECIFIED: ICD-10-CM

## 2022-04-28 DIAGNOSIS — H91.93 BILATERAL HEARING LOSS, UNSPECIFIED HEARING LOSS TYPE: ICD-10-CM

## 2022-04-28 DIAGNOSIS — E78.2 MIXED HYPERLIPIDEMIA: ICD-10-CM

## 2022-04-28 DIAGNOSIS — R42 DIZZINESS: Primary | ICD-10-CM

## 2022-04-28 DIAGNOSIS — E11.9 TYPE 2 DIABETES MELLITUS WITHOUT COMPLICATION, WITHOUT LONG-TERM CURRENT USE OF INSULIN: ICD-10-CM

## 2022-04-28 DIAGNOSIS — R53.1 WEAKNESS: ICD-10-CM

## 2022-04-28 DIAGNOSIS — M79.10 MUSCLE ACHE: ICD-10-CM

## 2022-04-28 DIAGNOSIS — E03.9 HYPOTHYROIDISM, UNSPECIFIED TYPE: ICD-10-CM

## 2022-04-28 PROBLEM — Z87.891 EX-SMOKER: Status: ACTIVE | Noted: 2017-08-27

## 2022-04-28 PROBLEM — K62.5 RECTAL HEMORRHAGE: Status: ACTIVE | Noted: 2018-04-17

## 2022-04-28 PROBLEM — R55 SYNCOPE: Status: ACTIVE | Noted: 2017-07-02

## 2022-04-28 PROBLEM — I95.1 POSTURAL HYPOTENSION: Status: ACTIVE | Noted: 2017-07-03

## 2022-04-28 PROBLEM — Z01.810 PREOP CARDIOVASCULAR EXAM: Status: ACTIVE | Noted: 2018-05-29

## 2022-04-28 PROBLEM — E55.9 VITAMIN D DEFICIENCY: Status: ACTIVE | Noted: 2017-06-18

## 2022-04-28 PROBLEM — Z12.39 BREAST CANCER SCREENING: Status: ACTIVE | Noted: 2017-09-28

## 2022-04-28 PROBLEM — F41.9 ANXIETY: Status: ACTIVE | Noted: 2018-02-13

## 2022-04-28 PROBLEM — R93.89 THICKENED ENDOMETRIUM: Status: ACTIVE | Noted: 2018-05-22

## 2022-04-28 PROBLEM — R53.83 FATIGUE: Status: ACTIVE | Noted: 2017-06-16

## 2022-04-28 PROBLEM — R93.1 ABNORMAL CT SCAN OF HEART: Status: ACTIVE | Noted: 2022-01-05

## 2022-04-28 PROBLEM — K59.00 CONSTIPATION: Status: ACTIVE | Noted: 2017-04-06

## 2022-04-28 PROBLEM — M25.519 SHOULDER PAIN: Status: ACTIVE | Noted: 2017-12-18

## 2022-04-28 PROBLEM — L98.9 SKIN LESION: Status: ACTIVE | Noted: 2019-05-09

## 2022-04-28 PROBLEM — M75.02 ADHESIVE CAPSULITIS OF LEFT SHOULDER: Status: ACTIVE | Noted: 2018-02-13

## 2022-04-28 PROBLEM — D22.9 MELANOCYTIC NEVUS: Status: ACTIVE | Noted: 2018-03-30

## 2022-04-28 PROBLEM — E53.8 VITAMIN B12 DEFICIENCY: Status: ACTIVE | Noted: 2017-06-18

## 2022-04-28 PROBLEM — I25.9 CHEST PAIN DUE TO MYOCARDIAL ISCHEMIA: Status: ACTIVE | Noted: 2018-05-29

## 2022-04-28 PROBLEM — R79.89 ABNORMAL LIVER FUNCTION TESTS: Status: ACTIVE | Noted: 2018-03-31

## 2022-04-28 PROBLEM — R73.09 INCREASED GLUCOSE LEVEL: Status: ACTIVE | Noted: 2017-01-21

## 2022-04-28 PROCEDURE — 99214 OFFICE O/P EST MOD 30 MIN: CPT

## 2022-04-28 RX ORDER — LISINOPRIL 10 MG/1
1 TABLET ORAL DAILY
COMMUNITY
Start: 2022-02-22 | End: 2022-04-28

## 2022-04-28 RX ORDER — FAMOTIDINE 40 MG/1
40 TABLET, FILM COATED ORAL DAILY
COMMUNITY
Start: 2022-04-13

## 2022-04-28 RX ORDER — PROPYLENE GLYCOL 0.06 MG/ML
SOLUTION/ DROPS OPHTHALMIC
COMMUNITY
Start: 2022-02-18 | End: 2023-04-03

## 2022-04-29 DIAGNOSIS — E55.9 VITAMIN D DEFICIENCY: Primary | ICD-10-CM

## 2022-04-29 LAB
25(OH)D3+25(OH)D2 SERPL-MCNC: 10.9 NG/ML (ref 30–100)
ALBUMIN SERPL-MCNC: 4.6 G/DL (ref 3.8–4.8)
ALBUMIN/GLOB SERPL: 2 {RATIO} (ref 1.2–2.2)
ALP SERPL-CCNC: 91 IU/L (ref 44–121)
ALT SERPL-CCNC: 70 IU/L (ref 0–32)
AST SERPL-CCNC: 59 IU/L (ref 0–40)
BASOPHILS # BLD AUTO: 0.1 X10E3/UL (ref 0–0.2)
BASOPHILS NFR BLD AUTO: 1 %
BILIRUB SERPL-MCNC: 0.5 MG/DL (ref 0–1.2)
BUN SERPL-MCNC: 10 MG/DL (ref 8–27)
BUN/CREAT SERPL: 11 (ref 12–28)
CALCIUM SERPL-MCNC: 9.7 MG/DL (ref 8.7–10.3)
CHLORIDE SERPL-SCNC: 100 MMOL/L (ref 96–106)
CHOLEST SERPL-MCNC: 154 MG/DL (ref 100–199)
CO2 SERPL-SCNC: 24 MMOL/L (ref 20–29)
CREAT SERPL-MCNC: 0.87 MG/DL (ref 0.57–1)
EGFRCR SERPLBLD CKD-EPI 2021: 73 ML/MIN/1.73
EOSINOPHIL # BLD AUTO: 0.1 X10E3/UL (ref 0–0.4)
EOSINOPHIL NFR BLD AUTO: 2 %
ERYTHROCYTE [DISTWIDTH] IN BLOOD BY AUTOMATED COUNT: 12 % (ref 11.7–15.4)
GLOBULIN SER CALC-MCNC: 2.3 G/DL (ref 1.5–4.5)
GLUCOSE SERPL-MCNC: 107 MG/DL (ref 65–99)
HBA1C MFR BLD: 5.9 % (ref 4.8–5.6)
HCT VFR BLD AUTO: 44 % (ref 34–46.6)
HDLC SERPL-MCNC: 59 MG/DL
HGB BLD-MCNC: 14.7 G/DL (ref 11.1–15.9)
IMM GRANULOCYTES # BLD AUTO: 0.1 X10E3/UL (ref 0–0.1)
IMM GRANULOCYTES NFR BLD AUTO: 1 %
LDLC SERPL CALC-MCNC: 68 MG/DL (ref 0–99)
LYMPHOCYTES # BLD AUTO: 2.1 X10E3/UL (ref 0.7–3.1)
LYMPHOCYTES NFR BLD AUTO: 30 %
MCH RBC QN AUTO: 31.2 PG (ref 26.6–33)
MCHC RBC AUTO-ENTMCNC: 33.4 G/DL (ref 31.5–35.7)
MCV RBC AUTO: 93 FL (ref 79–97)
MONOCYTES # BLD AUTO: 0.6 X10E3/UL (ref 0.1–0.9)
MONOCYTES NFR BLD AUTO: 8 %
NEUTROPHILS # BLD AUTO: 4.1 X10E3/UL (ref 1.4–7)
NEUTROPHILS NFR BLD AUTO: 58 %
PLATELET # BLD AUTO: 225 X10E3/UL (ref 150–450)
POTASSIUM SERPL-SCNC: 4.6 MMOL/L (ref 3.5–5.2)
PROT SERPL-MCNC: 6.9 G/DL (ref 6–8.5)
RBC # BLD AUTO: 4.71 X10E6/UL (ref 3.77–5.28)
SODIUM SERPL-SCNC: 141 MMOL/L (ref 134–144)
TRIGL SERPL-MCNC: 159 MG/DL (ref 0–149)
TSH SERPL DL<=0.005 MIU/L-ACNC: 1.18 UIU/ML (ref 0.45–4.5)
VIT B12 SERPL-MCNC: 280 PG/ML (ref 232–1245)
VLDLC SERPL CALC-MCNC: 27 MG/DL (ref 5–40)
WBC # BLD AUTO: 7 X10E3/UL (ref 3.4–10.8)

## 2022-04-29 RX ORDER — MULTIVIT-MIN/IRON/FOLIC ACID/K 18-600-40
2000 CAPSULE ORAL DAILY
Qty: 90 CAPSULE | Refills: 0 | Status: SHIPPED | OUTPATIENT
Start: 2022-04-29 | End: 2022-07-03

## 2022-05-04 LAB
FERRITIN SERPL-MCNC: 98 NG/ML (ref 15–150)
WRITTEN AUTHORIZATION: NORMAL

## 2022-05-18 ENCOUNTER — DOCUMENTATION (OUTPATIENT)
Dept: FAMILY MEDICINE CLINIC | Facility: CLINIC | Age: 67
End: 2022-05-18

## 2022-06-03 ENCOUNTER — OFFICE VISIT (OUTPATIENT)
Dept: FAMILY MEDICINE CLINIC | Facility: CLINIC | Age: 67
End: 2022-06-03

## 2022-06-03 VITALS
DIASTOLIC BLOOD PRESSURE: 76 MMHG | SYSTOLIC BLOOD PRESSURE: 128 MMHG | HEART RATE: 65 BPM | WEIGHT: 158 LBS | BODY MASS INDEX: 26.98 KG/M2 | HEIGHT: 64 IN | OXYGEN SATURATION: 98 %

## 2022-06-03 DIAGNOSIS — R42 DIZZINESS: Primary | ICD-10-CM

## 2022-06-03 DIAGNOSIS — H91.93 BILATERAL HEARING LOSS, UNSPECIFIED HEARING LOSS TYPE: ICD-10-CM

## 2022-06-03 PROCEDURE — 99214 OFFICE O/P EST MOD 30 MIN: CPT

## 2022-06-03 NOTE — PROGRESS NOTES
"Subjective   Gerda Bolden is a 66 y.o. female. Who presents with complaints of dizziness and hearing problem    History of Present Illness     Dizziness and hearing problem    States Still dizzy and loses balance- very vague when asked to describe symptoms.   States \" I don't know how to describe, I just feel dizzy\". Symptoms happen when she is lying down and sometimes when she gets up . Does not feel like room is spinning. Has not noticed triggers.   No nausea, vomiting, headache, vision changes. Does have chest pains which is chronic and has seen cardiology for this. cardiology work up in January unremarkable. No new symptoms.   Recent EGD and colonoscopy with no significant findings. Did have polyps but states was told pathology normal and follow up 5 years.    Continues to have difficulty hearing- has not set up appointment with ENT. No new symptoms.       The following portions of the patient's history were reviewed and updated as appropriate: allergies, current medications, past family history, past medical history, past social history and past surgical history.    Review of Systems   Constitutional: Negative for chills, fatigue and fever.   HENT: Positive for hearing loss. Negative for congestion, ear discharge, ear pain and swollen glands.    Eyes: Negative for visual disturbance.   Respiratory: Negative.    Cardiovascular: Positive for chest pain (chronic- intermittent). Negative for palpitations and leg swelling.   Gastrointestinal: Negative for constipation and diarrhea.   Endocrine: Negative.    Genitourinary: Negative for difficulty urinating and dysuria.   Neurological: Positive for dizziness. Negative for tremors, seizures, syncope, weakness, headache, memory problem and confusion.       Objective    /76   Pulse 65   Ht 162.6 cm (64\")   Wt 71.7 kg (158 lb)   SpO2 98%   BMI 27.12 kg/m²     Physical Exam  Constitutional:       Appearance: Normal appearance. She is not ill-appearing.   HENT: "      Head: Normocephalic.      Right Ear: Tympanic membrane, ear canal and external ear normal. There is no impacted cerumen.      Left Ear: Tympanic membrane, ear canal and external ear normal. There is no impacted cerumen.      Ears:      Comments: Very hard of hearing     Mouth/Throat:      Mouth: Mucous membranes are moist.      Pharynx: Oropharynx is clear. No oropharyngeal exudate or posterior oropharyngeal erythema.   Eyes:      Extraocular Movements: Extraocular movements intact.      Conjunctiva/sclera: Conjunctivae normal.      Pupils: Pupils are equal, round, and reactive to light.   Neck:      Vascular: No carotid bruit.   Cardiovascular:      Rate and Rhythm: Normal rate and regular rhythm.      Pulses: Normal pulses.           Carotid pulses are 2+ on the right side and 2+ on the left side.       Radial pulses are 2+ on the right side and 2+ on the left side.        Posterior tibial pulses are 2+ on the right side and 2+ on the left side.      Heart sounds: Normal heart sounds, S1 normal and S2 normal. No murmur heard.  Pulmonary:      Effort: Pulmonary effort is normal. No respiratory distress.      Breath sounds: Normal breath sounds. No wheezing.   Abdominal:      General: Bowel sounds are normal.      Palpations: Abdomen is soft.      Tenderness: There is no abdominal tenderness.   Musculoskeletal:      Right lower leg: No edema.      Left lower leg: No edema.   Neurological:      General: No focal deficit present.      Mental Status: She is alert and oriented to person, place, and time.      Cranial Nerves: No cranial nerve deficit, dysarthria or facial asymmetry.      Motor: No weakness.      Gait: Gait is intact.   Psychiatric:         Attention and Perception: Attention normal.         Mood and Affect: Mood normal.         Speech: Speech normal.         Behavior: Behavior normal.         Thought Content: Thought content normal.         Cognition and Memory: Cognition normal.         Judgment:  Judgment normal.         Assessment & Plan   Diagnoses and all orders for this visit:    1. Dizziness (Primary)    2. Bilateral hearing loss, unspecified hearing loss type    Discussed recent lab and CT results with pt which were unremarkable except from very low vitamin D which was advised to take supplement and has not started, states will  today.   Also has not made changes in diet and lifestyle and continues to be very sedentary.     Lengthy discussion about importance of healthy diet and exercise. I think poor intake and unhealthy diet definitely contributing to symptoms. I did also strongly advise to check ENT for her heating problems and rule out any causes that could be contributing to symptoms.   Make sure drinking at least 64 oz water daily. Start vitamin D supplementation.     If no improvement after doing all that advised RTC, or return if new or worsening symptoms. For severe symptoms such as dizziness that does not resolve, headache, weakness, fever, CP go to ED.        - Pt seen with  who provided some of the hx and helps with communication as pt very [place]. Pt &  agree with plan of care and states no further concerns or questions today    This document is intended for medical expert use only. Reading of this document by patients and/or patient's family without participating medical staff guidance may result in misinterpretation and unintended morbidity.  Any interpretation of such data is the responsibility of the patient and/or family member responsible for the patient in concert with their primary or specialist providers, not to be left for sources of online searches such as [employer], [employer] or similar queries. Relying on these approaches to knowledge may result in misinterpretation, misguided goals of care and even death should patients or family members try recommendations outside of the realm of professional medical care in a supervised way.     Please allow 3-5 business days for  recommendations based on new results     Go to the ER for any possible lifethreatening symptoms such as chest pain or shortness of air.      I personally spent 30 minutes with this patient, preparing for the visit, reviewing tests, obtaining and/or reviewing a separately obtained history, performing a medically appropriate examination and/or evaluation , counseling and educating the patient/family/caregiver, ordering medications, tests, or procedures, documenting information in the medical record and independently interpreting results.

## 2022-07-03 DIAGNOSIS — K76.0 FATTY LIVER: ICD-10-CM

## 2022-07-03 DIAGNOSIS — E55.9 VITAMIN D DEFICIENCY: ICD-10-CM

## 2022-07-03 RX ORDER — ACETAMINOPHEN 160 MG
TABLET,DISINTEGRATING ORAL
Qty: 90 CAPSULE | Refills: 0 | Status: SHIPPED | OUTPATIENT
Start: 2022-07-03 | End: 2022-08-31

## 2022-07-03 RX ORDER — VITAMIN E 268 MG
CAPSULE ORAL
Qty: 90 CAPSULE | Refills: 1 | Status: SHIPPED | OUTPATIENT
Start: 2022-07-03 | End: 2023-01-15

## 2022-07-05 ENCOUNTER — OFFICE (OUTPATIENT)
Dept: URBAN - METROPOLITAN AREA CLINIC 75 | Facility: CLINIC | Age: 67
End: 2022-07-05

## 2022-07-05 VITALS
HEIGHT: 65 IN | HEART RATE: 64 BPM | DIASTOLIC BLOOD PRESSURE: 70 MMHG | OXYGEN SATURATION: 97 % | SYSTOLIC BLOOD PRESSURE: 110 MMHG | WEIGHT: 161 LBS

## 2022-07-05 DIAGNOSIS — K57.30 DIVERTICULOSIS OF LARGE INTESTINE WITHOUT PERFORATION OR ABS: ICD-10-CM

## 2022-07-05 DIAGNOSIS — Z86.010 PERSONAL HISTORY OF COLONIC POLYPS: ICD-10-CM

## 2022-07-05 DIAGNOSIS — K22.70 BARRETT'S ESOPHAGUS WITHOUT DYSPLASIA: ICD-10-CM

## 2022-07-05 DIAGNOSIS — R74.8 ABNORMAL LEVELS OF OTHER SERUM ENZYMES: ICD-10-CM

## 2022-07-05 DIAGNOSIS — E83.119 HEMOCHROMATOSIS, UNSPECIFIED: ICD-10-CM

## 2022-07-05 DIAGNOSIS — K75.81 NONALCOHOLIC STEATOHEPATITIS (NASH): ICD-10-CM

## 2022-07-05 DIAGNOSIS — R13.10 DYSPHAGIA, UNSPECIFIED: ICD-10-CM

## 2022-07-05 PROCEDURE — 99214 OFFICE O/P EST MOD 30 MIN: CPT | Performed by: NURSE PRACTITIONER

## 2022-07-11 ENCOUNTER — HOSPITAL ENCOUNTER (OUTPATIENT)
Dept: BONE DENSITY | Facility: HOSPITAL | Age: 67
Discharge: HOME | End: 2022-07-11

## 2022-07-11 PROCEDURE — 77080 DXA BONE DENSITY AXIAL: CPT

## 2022-08-25 ENCOUNTER — OFFICE (OUTPATIENT)
Dept: URBAN - METROPOLITAN AREA CLINIC 75 | Facility: CLINIC | Age: 67
End: 2022-08-25

## 2022-08-25 DIAGNOSIS — K21.9 GASTRO-ESOPHAGEAL REFLUX DISEASE WITHOUT ESOPHAGITIS: ICD-10-CM

## 2022-08-25 DIAGNOSIS — K22.70 BARRETT'S ESOPHAGUS WITHOUT DYSPLASIA: ICD-10-CM

## 2022-08-25 DIAGNOSIS — K59.00 CONSTIPATION, UNSPECIFIED: ICD-10-CM

## 2022-08-25 PROCEDURE — 99214 OFFICE O/P EST MOD 30 MIN: CPT | Performed by: INTERNAL MEDICINE

## 2022-08-30 DIAGNOSIS — E55.9 VITAMIN D DEFICIENCY: ICD-10-CM

## 2022-08-31 RX ORDER — ACETAMINOPHEN 160 MG
TABLET,DISINTEGRATING ORAL
Qty: 90 CAPSULE | Refills: 0 | Status: SHIPPED | OUTPATIENT
Start: 2022-08-31 | End: 2022-10-11

## 2022-09-09 DIAGNOSIS — E03.9 HYPOTHYROIDISM, UNSPECIFIED TYPE: ICD-10-CM

## 2022-09-09 RX ORDER — LEVOTHYROXINE SODIUM 0.07 MG/1
75 TABLET ORAL DAILY
Qty: 20 TABLET | Refills: 0 | Status: SHIPPED | OUTPATIENT
Start: 2022-09-09

## 2022-09-09 RX ORDER — LEVOTHYROXINE SODIUM 0.07 MG/1
75 TABLET ORAL DAILY
Qty: 90 TABLET | Refills: 1 | Status: SHIPPED | OUTPATIENT
Start: 2022-09-09 | End: 2022-09-09 | Stop reason: SDUPTHER

## 2022-09-21 DIAGNOSIS — E11.9 TYPE 2 DIABETES MELLITUS WITHOUT COMPLICATION, WITHOUT LONG-TERM CURRENT USE OF INSULIN: Primary | ICD-10-CM

## 2022-10-10 DIAGNOSIS — E55.9 VITAMIN D DEFICIENCY: ICD-10-CM

## 2022-10-11 RX ORDER — ACETAMINOPHEN 160 MG
TABLET,DISINTEGRATING ORAL
Qty: 90 CAPSULE | Refills: 0 | Status: SHIPPED | OUTPATIENT
Start: 2022-10-11 | End: 2023-01-15

## 2022-12-06 DIAGNOSIS — E11.9 TYPE 2 DIABETES MELLITUS WITHOUT COMPLICATION, WITHOUT LONG-TERM CURRENT USE OF INSULIN: ICD-10-CM

## 2022-12-21 ENCOUNTER — OFFICE VISIT (OUTPATIENT)
Dept: FAMILY MEDICINE CLINIC | Facility: CLINIC | Age: 67
End: 2022-12-21

## 2022-12-21 VITALS
RESPIRATION RATE: 18 BRPM | BODY MASS INDEX: 27.31 KG/M2 | DIASTOLIC BLOOD PRESSURE: 62 MMHG | WEIGHT: 160 LBS | OXYGEN SATURATION: 98 % | SYSTOLIC BLOOD PRESSURE: 110 MMHG | TEMPERATURE: 97.1 F | HEART RATE: 54 BPM | HEIGHT: 64 IN

## 2022-12-21 DIAGNOSIS — I10 BENIGN ESSENTIAL HYPERTENSION: ICD-10-CM

## 2022-12-21 DIAGNOSIS — Z00.00 MEDICARE ANNUAL WELLNESS VISIT, SUBSEQUENT: Primary | ICD-10-CM

## 2022-12-21 DIAGNOSIS — E78.2 MIXED HYPERLIPIDEMIA: ICD-10-CM

## 2022-12-21 DIAGNOSIS — E55.9 VITAMIN D DEFICIENCY: ICD-10-CM

## 2022-12-21 DIAGNOSIS — R07.9 CHEST PAIN, UNSPECIFIED TYPE: ICD-10-CM

## 2022-12-21 DIAGNOSIS — E03.9 HYPOTHYROIDISM, UNSPECIFIED TYPE: ICD-10-CM

## 2022-12-21 DIAGNOSIS — E11.9 TYPE 2 DIABETES MELLITUS WITHOUT COMPLICATION, WITHOUT LONG-TERM CURRENT USE OF INSULIN: ICD-10-CM

## 2022-12-21 DIAGNOSIS — R21 RASH AND NONSPECIFIC SKIN ERUPTION: ICD-10-CM

## 2022-12-21 DIAGNOSIS — Z87.891 HISTORY OF CIGARETTE SMOKING: ICD-10-CM

## 2022-12-21 PROCEDURE — 1170F FXNL STATUS ASSESSED: CPT

## 2022-12-21 PROCEDURE — G0439 PPPS, SUBSEQ VISIT: HCPCS

## 2022-12-21 PROCEDURE — 1159F MED LIST DOCD IN RCRD: CPT

## 2022-12-21 PROCEDURE — 99214 OFFICE O/P EST MOD 30 MIN: CPT

## 2022-12-21 PROCEDURE — 1126F AMNT PAIN NOTED NONE PRSNT: CPT

## 2022-12-21 NOTE — PROGRESS NOTES
The ABCs of the Annual Wellness Visit  Subsequent Medicare Wellness Visit    Subjective      Gerda Bolden is a 67 y.o. female who presents for a Subsequent Medicare Wellness Visit.    The following portions of the patient's history were reviewed and   updated as appropriate: allergies, current medications, past family history, past medical history, past social history and past surgical history.    Compared to one year ago, the patient feels her physical   health is the same.    Compared to one year ago, the patient feels her mental   health is the same.    Recent Hospitalizations:  She was not admitted to the hospital during the last year.       Current Medical Providers:  Patient Care Team:  Maria Esther Dennis APRN as PCP - General (Family Medicine)    Outpatient Medications Prior to Visit   Medication Sig Dispense Refill   • Alirocumab (Praluent) 75 MG/ML solution auto-injector Inject 1 mL under the skin into the appropriate area as directed Every 14 (Fourteen) Days. 2.24 mL 2   • aspirin 81 MG EC tablet Take 81 mg by mouth Daily.     • Cholecalciferol (Vitamin D3) 50 MCG (2000 UT) capsule TAKE 1 CAPSULE BY MOUTH EVERY DAY 90 capsule 0   • dexlansoprazole (DEXILANT) 60 MG capsule Take 60 mg by mouth Daily.     • famotidine (PEPCID) 40 MG tablet Take 40 mg by mouth Daily.     • isosorbide mononitrate (IMDUR) 30 MG 24 hr tablet Take 30 mg by mouth Daily.     • levothyroxine (SYNTHROID, LEVOTHROID) 75 MCG tablet Take 1 tablet by mouth Daily. 20 tablet 0   • metFORMIN (GLUCOPHAGE) 500 MG tablet TAKE 1 TABLET BY MOUTH TWICE DAILY WITH MEALS 180 tablet 0   • metoprolol succinate XL (TOPROL-XL) 25 MG 24 hr tablet Take 25 mg by mouth Daily.     • Systane Complete 0.6 % solution SHAKE LIQUID WELL AND INSTILL 1 DROP INTO IN EACH EYE FOUR TIMES DAILY     • vitamin E 400 UNIT capsule TAKE 1 CAPSULE BY MOUTH EVERY DAY 90 capsule 1     No facility-administered medications prior to visit.       No opioid medication identified on  active medication list. I have reviewed chart for other potential  high risk medication/s and harmful drug interactions in the elderly.          Aspirin is on active medication list. Aspirin use is indicated based on review of current medical condition/s. Pros and cons of this therapy have been discussed today. Benefits of this medication outweigh potential harm.  Patient has been encouraged to continue taking this medication.  .      Patient Active Problem List   Diagnosis   • Abnormal CT scan of heart   • Abnormal liver function tests   • Adhesive capsulitis of left shoulder   • Anxiety   • Arteriosclerosis of coronary artery   • Benign essential hypertension   • Bilateral hearing loss   • Bradycardia   • Chest pain due to myocardial ischemia   • Chest pain   • Ischemic chest pain (HCC)   • Chronic obstructive pulmonary disease (HCC)   • Constipation   • Diabetes mellitus (HCC)   • Disequilibrium   • Dyslipidemia   • Ex-smoker   • Fatigue   • Gastroesophageal reflux disease with hiatal hernia   • Hyperlipidemia   • Hypothyroidism   • Immunization due   • Increased glucose level   • Melanocytic nevus   • Mixed anxiety and depressive disorder   • Near syncope   • Paroxysmal atrial fibrillation (HCC)   • Postural hypotension   • Breast cancer screening   • Encounter for follow-up examination after completed treatment for conditions other than malignant neoplasm   • Preop cardiovascular exam   • Presence of coronary angioplasty implant and graft   • Rectal hemorrhage   • Shoulder pain   • Skin lesion   • Syncope   • Thickened endometrium   • Tobacco user   • Unstable angina (HCC)   • Vitamin B12 deficiency   • Vitamin D deficiency     Advance Care Planning  Advance Directive is not on file.  ACP discussion was held with the patient during this visit. Patient does not have an advance directive, declines further assistance.     Objective    Vitals:    12/21/22 0811   BP: 110/62   Pulse: 54   Resp: 18   Temp: 97.1 °F  "(36.2 °C)   TempSrc: Temporal   SpO2: 98%   Weight: 72.6 kg (160 lb)   Height: 162.6 cm (64\")   PainSc: 0-No pain     Estimated body mass index is 27.46 kg/m² as calculated from the following:    Height as of this encounter: 162.6 cm (64\").    Weight as of this encounter: 72.6 kg (160 lb).    BMI is >= 25 and <30. (Overweight) The following options were offered after discussion;: nutrition counseling/recommendations      Does the patient have evidence of cognitive impairment?   No            HEALTH RISK ASSESSMENT    Smoking Status:  Social History     Tobacco Use   Smoking Status Former   • Packs/day: 1.50   • Years: 46.00   • Pack years: 69.00   • Types: Cigarettes   • Start date:    • Quit date:    • Years since quittin.9   Smokeless Tobacco Never     Alcohol Consumption:  Social History     Substance and Sexual Activity   Alcohol Use Never     Fall Risk Screen:    OLIVERADI Fall Risk Assessment was completed, and patient is at LOW risk for falls.Assessment completed on:2022    Depression Screening:  PHQ-2/PHQ-9 Depression Screening 2022   Retired PHQ-9 Total Score -   Retired Total Score -   Little Interest or Pleasure in Doing Things 0-->not at all   Feeling Down, Depressed or Hopeless 0-->not at all   PHQ-9: Brief Depression Severity Measure Score 0       Health Habits and Functional and Cognitive Screening:  Functional & Cognitive Status 2022   Do you have difficulty preparing food and eating? No   Do you have difficulty bathing yourself, getting dressed or grooming yourself? No   Do you have difficulty using the toilet? No   Do you have difficulty moving around from place to place? No   Do you have trouble with steps or getting out of a bed or a chair? No   Current Diet Well Balanced Diet   Dental Exam Not up to date   Eye Exam Up to date   Exercise (times per week) 0 times per week   Current Exercises Include No Regular Exercise   Do you need help using the phone?  No   Are you " deaf or do you have serious difficulty hearing?  No   Do you need help with transportation? No   Do you need help shopping? No   Do you need help preparing meals?  No   Do you need help with housework?  No   Do you need help with laundry? No   Do you need help taking your medications? No   Do you need help managing money? No   Do you ever drive or ride in a car without wearing a seat belt? No   Have you felt unusual stress, anger or loneliness in the last month? No   Who do you live with? Spouse   If you need help, do you have trouble finding someone available to you? No   Have you been bothered in the last four weeks by sexual problems? No   Do you have difficulty concentrating, remembering or making decisions? No       Age-appropriate Screening Schedule:  Refer to the list below for future screening recommendations based on patient's age, sex and/or medical conditions. Orders for these recommended tests are listed in the plan section. The patient has been provided with a written plan.    Health Maintenance   Topic Date Due   • DIABETIC FOOT EXAM  10/21/2022   • HEMOGLOBIN A1C  10/28/2022   • ZOSTER VACCINE (1 of 2) 12/21/2022 (Originally 11/12/2005)   • INFLUENZA VACCINE  03/31/2023 (Originally 8/1/2022)   • TDAP/TD VACCINES (1 - Tdap) 12/21/2023 (Originally [birth year]/1974)   • URINE MICROALBUMIN  01/03/2023   • LIPID PANEL  04/28/2023   • DIABETIC EYE EXAM  06/10/2023   • MAMMOGRAM  10/14/2023   • DXA SCAN  07/11/2024                CMS Preventative Services Quick Reference  Risk Factors Identified During Encounter:    Fall Risk-High or Moderate: Discussed Fall Prevention in the home  Hearing Problem: has been referred prior  Inactivity/Sedentary: Patient was advised to exercise at least 150 minutes a week per CDC recommendations.  Polypharmacy: Medication List reviewed  Vision Screening Recommended    The above risks/problems have been discussed with the patient.  Pertinent information has been shared with the  patient in the After Visit Summary.    Diagnoses and all orders for this visit:    1. Type 2 diabetes mellitus without complication, without long-term current use of insulin (HCC) (Primary)  -     Hemoglobin A1c  -     Microalbumin / Creatinine Urine Ratio - Urine, Clean Catch    2. Vitamin D deficiency  -     Vitamin D 25 hydroxy    3. Hypothyroidism, unspecified type  -     TSH    4. Benign essential hypertension  -     CBC & Differential  -     Comprehensive Metabolic Panel  -     TSH    5. Mixed hyperlipidemia  -     Lipid Panel    6. Rash and nonspecific skin eruption  -     triamcinolone (KENALOG) 0.1 % ointment; Apply 1 application topically to the appropriate area as directed 2 (Two) Times a Day for 14 days.  Dispense: 30 g; Refill: 0    7. History of cigarette smoking  -     CT Chest Low Dose Wo; Future        Follow Up:   Next Medicare Wellness visit to be scheduled in 1 year.      An After Visit Summary and PPPS were made available to the patient.

## 2022-12-21 NOTE — PROGRESS NOTES
"Subjective   Gerda Bolden is a 67 y.o. female. Who presents for routine follow up and annual      History of Present Illness     Complaint of Rash- went to [place] a few weeks ago and was on the beach barefoot. Came back home with rash On hands and lower legs. Itching.   Seems improving but thinks getting more spots on hands.     DM2- on metformin 500 mg BID. Tolerating well. Does not check BG. Due labs    Osteopenia- taking Calcium 600 mg and vitamin d3 2000 U.   Quit smoking 6 yrs, ago- Due lung ca screen    HLD- on praluent doing well     Hx atypical chest pains. States unchanged. Gets occasionally- has mentioned to Dr Arnett prior and work up has been negative. Describes as sudden sharp pain in both sides of chest and epigastric area. Sometimes under axilla. Only last a few secs. No associated symptoms. No triggers.     Diet: has been trying to limit fast foods. Has stayed more active.    The following portions of the patient's history were reviewed and updated as appropriate: allergies, current medications, past family history, past medical history, past social history and past surgical history.    Review of Systems   Constitutional: Negative for fatigue, fever and unexpected weight loss.   Respiratory: Negative.    Cardiovascular: Positive for chest pain (chronic). Negative for palpitations and leg swelling.   Endocrine: Negative.    Genitourinary: Negative for difficulty urinating.   Skin: Positive for rash.   Neurological: Negative for dizziness and headache.   Psychiatric/Behavioral: Negative for sleep disturbance and depressed mood.       Objective    /62   Pulse 54   Temp 97.1 °F (36.2 °C) (Temporal)   Resp 18   Ht 162.6 cm (64\")   Wt 72.6 kg (160 lb)   SpO2 98%   BMI 27.46 kg/m²     Physical Exam  Constitutional:       Appearance: Normal appearance. She is not ill-appearing.   HENT:      Head:      Comments: Very hard of hearing- chronic  Cardiovascular:      Rate and Rhythm: Normal rate " and regular rhythm.      Pulses: Normal pulses.      Heart sounds: Normal heart sounds, S1 normal and S2 normal. No murmur heard.  Pulmonary:      Effort: Pulmonary effort is normal. No respiratory distress.      Breath sounds: Normal breath sounds.   Skin:     Findings: Rash present. Rash is crusting and papular.      Comments: Scattered, small (< 0.5 cm), pink, mildly raised, dry spots on upper and lower extremities.  Non tender. No drainage. No blisters   Neurological:      General: No focal deficit present.      Mental Status: She is alert and oriented to person, place, and time.      Cranial Nerves: No dysarthria.      Gait: Gait is intact.   Psychiatric:         Attention and Perception: Attention normal.         Mood and Affect: Mood normal.         Speech: Speech normal.         Behavior: Behavior normal.         Thought Content: Thought content normal.         Cognition and Memory: Cognition normal.         Judgment: Judgment normal.       Assessment & Plan   Diagnoses and all orders for this visit:    1. Medicare annual wellness visit, subsequent (Primary)  -     CBC & Differential  -     Comprehensive Metabolic Panel  -     Hemoglobin A1c  -     Lipid Panel  -     CT Chest Low Dose Wo; Future  -      Counseled on recommended screenings. Update lung ca screening. Others UTD. Eat a healthy diet and exercise routinely. Avoid smoking/alcohol and drugs. Use seatbelt 100% of time.     2. Type 2 diabetes mellitus without complication, without long-term current use of insulin (HCC)  -     Hemoglobin A1c  -     Microalbumin / Creatinine Urine Ratio - Urine, Clean Catch  -     Has been stable. Cont metformin and work on diet and exercise. Update monitoring     3. Vitamin D deficiency  -     Vitamin D 25 hydroxy    4. Hypothyroidism, unspecified type  -     TSH    5. Benign essential hypertension  -     CBC & Differential  -     Comprehensive Metabolic Panel  -     TSH  -     Stable. Cont same tx    6. Mixed  hyperlipidemia  -     Lipid Panel    7. Rash and nonspecific skin eruption  -     triamcinolone (KENALOG) 0.1 % ointment; Apply 1 application topically to the appropriate area as directed 2 (Two) Times a Day for 14 days.  Dispense: 30 g; Refill: 0  -     Not consistent with sand fleas as very scattered but a possibility vs allergic. Will give topical steroid. Use discussed. Do not use more than 2 weeks. Add daily non drowsy antihistamine PRN for itching.   If no improvement in 2 weeks call     8. Chest pain         -    Chronic complaint. States unchanged since last time saw cardiology and work up has been negative. Continue same tx. Discussed described symptoms most consistent with GERD vs muscular. If worsening or new symptoms follow with cardiology.   If severe or unrelenting symptoms go to ED OR CALL 911.     9. History of cigarette smoking  -     CT Chest Low Dose Wo; Future  -     Together we discussed the potential benefits and potential harms of being screened for lung cancer including the potential for follow up diagnostic testing, risk for over diagnosis, false positive rate and radiation exposure   We have discussed that annual screening is recommended until fifteen years beyond smoking as long as they have no other disease or comorbidity that would prevent them from receiving cancer treatments such as surgery should a lung cancer be detected. the patient has verbalized understanding of recommendations for and wishes proceed with a Low Dose Lung Cancer Screening CT today.        Follow up : 6 months, sooner as needed         - Pt seen with . Pt and  agrees with plan of care and states no further concerns or questions today    This document is intended for medical expert use only. Reading of this document by patients and/or patient's family without participating medical staff guidance may result in misinterpretation and unintended morbidity.  Any interpretation of such data is the  responsibility of the patient and/or family member responsible for the patient in concert with their primary or specialist providers, not to be left for sources of online searches such as [employer], [employer] or similar queries. Relying on these approaches to knowledge may result in misinterpretation, misguided goals of care and even death should patients or family members try recommendations outside of the realm of professional medical care in a supervised way.     Please allow 3-5 business days for recommendations based on new results     Go to the ER for any possible lifethreatening symptoms such as chest pain or shortness of air.      I personally spent 30 minutes with this patient, preparing for the visit, reviewing tests, obtaining and/or reviewing a separately obtained history, performing a medically appropriate examination and/or evaluation , counseling and educating the patient/family/caregiver, ordering medications, tests, or procedures, documenting information in the medical record and independently interpreting results.

## 2022-12-22 LAB
25(OH)D3+25(OH)D2 SERPL-MCNC: 54 NG/ML (ref 30–100)
ALBUMIN SERPL-MCNC: 4.3 G/DL (ref 3.5–5.2)
ALBUMIN/CREAT UR: 3 MG/G CREAT (ref 0–29)
ALBUMIN/GLOB SERPL: 1.8 G/DL
ALP SERPL-CCNC: 91 U/L (ref 39–117)
ALT SERPL-CCNC: 52 U/L (ref 1–33)
AST SERPL-CCNC: 51 U/L (ref 1–32)
BASOPHILS # BLD AUTO: 0.05 10*3/MM3 (ref 0–0.2)
BASOPHILS NFR BLD AUTO: 0.8 % (ref 0–1.5)
BILIRUB SERPL-MCNC: 0.5 MG/DL (ref 0–1.2)
BUN SERPL-MCNC: 12 MG/DL (ref 8–23)
BUN/CREAT SERPL: 13 (ref 7–25)
CALCIUM SERPL-MCNC: 10.1 MG/DL (ref 8.6–10.5)
CHLORIDE SERPL-SCNC: 102 MMOL/L (ref 98–107)
CHOLEST SERPL-MCNC: 180 MG/DL (ref 0–200)
CO2 SERPL-SCNC: 30.6 MMOL/L (ref 22–29)
CREAT SERPL-MCNC: 0.92 MG/DL (ref 0.57–1)
CREAT UR-MCNC: 144 MG/DL
EGFRCR SERPLBLD CKD-EPI 2021: 68.4 ML/MIN/1.73
EOSINOPHIL # BLD AUTO: 0.17 10*3/MM3 (ref 0–0.4)
EOSINOPHIL NFR BLD AUTO: 2.7 % (ref 0.3–6.2)
ERYTHROCYTE [DISTWIDTH] IN BLOOD BY AUTOMATED COUNT: 12.4 % (ref 12.3–15.4)
GLOBULIN SER CALC-MCNC: 2.4 GM/DL
GLUCOSE SERPL-MCNC: 118 MG/DL (ref 65–99)
HBA1C MFR BLD: 6.1 % (ref 4.8–5.6)
HCT VFR BLD AUTO: 41.8 % (ref 34–46.6)
HDLC SERPL-MCNC: 56 MG/DL (ref 40–60)
HGB BLD-MCNC: 14.8 G/DL (ref 12–15.9)
IMM GRANULOCYTES # BLD AUTO: 0.06 10*3/MM3 (ref 0–0.05)
IMM GRANULOCYTES NFR BLD AUTO: 0.9 % (ref 0–0.5)
LDLC SERPL CALC-MCNC: 90 MG/DL (ref 0–100)
LYMPHOCYTES # BLD AUTO: 2.44 10*3/MM3 (ref 0.7–3.1)
LYMPHOCYTES NFR BLD AUTO: 38.2 % (ref 19.6–45.3)
MCH RBC QN AUTO: 32 PG (ref 26.6–33)
MCHC RBC AUTO-ENTMCNC: 35.4 G/DL (ref 31.5–35.7)
MCV RBC AUTO: 90.3 FL (ref 79–97)
MICROALBUMIN UR-MCNC: 4.3 UG/ML
MONOCYTES # BLD AUTO: 0.48 10*3/MM3 (ref 0.1–0.9)
MONOCYTES NFR BLD AUTO: 7.5 % (ref 5–12)
NEUTROPHILS # BLD AUTO: 3.19 10*3/MM3 (ref 1.7–7)
NEUTROPHILS NFR BLD AUTO: 49.9 % (ref 42.7–76)
NRBC BLD AUTO-RTO: 0 /100 WBC (ref 0–0.2)
PLATELET # BLD AUTO: 211 10*3/MM3 (ref 140–450)
POTASSIUM SERPL-SCNC: 4.1 MMOL/L (ref 3.5–5.2)
PROT SERPL-MCNC: 6.7 G/DL (ref 6–8.5)
RBC # BLD AUTO: 4.63 10*6/MM3 (ref 3.77–5.28)
SODIUM SERPL-SCNC: 143 MMOL/L (ref 136–145)
TRIGL SERPL-MCNC: 202 MG/DL (ref 0–150)
TSH SERPL DL<=0.005 MIU/L-ACNC: 4.03 UIU/ML (ref 0.27–4.2)
VLDLC SERPL CALC-MCNC: 34 MG/DL (ref 5–40)
WBC # BLD AUTO: 6.39 10*3/MM3 (ref 3.4–10.8)

## 2022-12-22 NOTE — PROGRESS NOTES
Please call pt with lab results    Blood levels within normal limits  Kidney, liver and thyroid function stable    Cholesterol is elevated. Make sure to take praluent as prescribed. Work on healthy diet and stay active.. Limit bad fats such as fried foods, whole fat dairy products and red meats and increase vegetables, whole grains, fish, nuts and olive oil. Limit sugar and salt. Drink at least 64 oz water daily.     A1c is stable but has gone a little from last time. Work on diet      Repeat labs in 6 months- make sure fasting - Call if questions.

## 2022-12-23 ENCOUNTER — TELEPHONE (OUTPATIENT)
Dept: FAMILY MEDICINE CLINIC | Facility: CLINIC | Age: 67
End: 2022-12-23

## 2023-01-10 ENCOUNTER — OFFICE (OUTPATIENT)
Dept: URBAN - METROPOLITAN AREA CLINIC 76 | Facility: CLINIC | Age: 68
End: 2023-01-10
Payer: OTHER GOVERNMENT

## 2023-01-10 VITALS
DIASTOLIC BLOOD PRESSURE: 78 MMHG | OXYGEN SATURATION: 96 % | SYSTOLIC BLOOD PRESSURE: 130 MMHG | HEIGHT: 65 IN | HEART RATE: 70 BPM | WEIGHT: 159 LBS

## 2023-01-10 DIAGNOSIS — K59.00 CONSTIPATION, UNSPECIFIED: ICD-10-CM

## 2023-01-10 DIAGNOSIS — K21.9 GASTRO-ESOPHAGEAL REFLUX DISEASE WITHOUT ESOPHAGITIS: ICD-10-CM

## 2023-01-10 DIAGNOSIS — E83.119 HEMOCHROMATOSIS, UNSPECIFIED: ICD-10-CM

## 2023-01-10 DIAGNOSIS — Z86.010 PERSONAL HISTORY OF COLONIC POLYPS: ICD-10-CM

## 2023-01-10 DIAGNOSIS — R74.8 ABNORMAL LEVELS OF OTHER SERUM ENZYMES: ICD-10-CM

## 2023-01-10 DIAGNOSIS — K75.81 NONALCOHOLIC STEATOHEPATITIS (NASH): ICD-10-CM

## 2023-01-10 DIAGNOSIS — K22.70 BARRETT'S ESOPHAGUS WITHOUT DYSPLASIA: ICD-10-CM

## 2023-01-10 PROCEDURE — 99214 OFFICE O/P EST MOD 30 MIN: CPT | Performed by: NURSE PRACTITIONER

## 2023-01-11 NOTE — PROGRESS NOTES
Please call and inform pt of CT chest results    No lung nodules or suspicious masses- recommend routine screening for lung cancer In 1 year per recommendations    They did notice build up of fat/hardening of arteries and fatty liver. Make sure compliant with cholesterol medication and work on diet.

## 2023-01-14 DIAGNOSIS — E55.9 VITAMIN D DEFICIENCY: ICD-10-CM

## 2023-01-14 DIAGNOSIS — K76.0 FATTY LIVER: ICD-10-CM

## 2023-01-15 RX ORDER — ACETAMINOPHEN 160 MG
TABLET,DISINTEGRATING ORAL
Qty: 90 CAPSULE | Refills: 0 | Status: SHIPPED | OUTPATIENT
Start: 2023-01-15

## 2023-01-15 RX ORDER — VITAMIN E 268 MG
CAPSULE ORAL
Qty: 90 CAPSULE | Refills: 0 | Status: SHIPPED | OUTPATIENT
Start: 2023-01-15

## 2023-02-26 DIAGNOSIS — E78.2 MIXED HYPERLIPIDEMIA: ICD-10-CM

## 2023-02-27 RX ORDER — ALIROCUMAB 75 MG/ML
75 INJECTION, SOLUTION SUBCUTANEOUS
Qty: 2.24 ML | Refills: 2 | Status: SHIPPED | OUTPATIENT
Start: 2023-02-27 | End: 2023-03-01 | Stop reason: SDUPTHER

## 2023-03-01 DIAGNOSIS — E78.2 MIXED HYPERLIPIDEMIA: ICD-10-CM

## 2023-03-01 RX ORDER — ALIROCUMAB 75 MG/ML
75 INJECTION, SOLUTION SUBCUTANEOUS
Qty: 2.24 ML | Refills: 2 | Status: SHIPPED | OUTPATIENT
Start: 2023-03-01

## 2023-03-11 DIAGNOSIS — E11.9 TYPE 2 DIABETES MELLITUS WITHOUT COMPLICATION, WITHOUT LONG-TERM CURRENT USE OF INSULIN: ICD-10-CM

## 2023-04-03 ENCOUNTER — OFFICE VISIT (OUTPATIENT)
Dept: FAMILY MEDICINE CLINIC | Facility: CLINIC | Age: 68
End: 2023-04-03
Payer: MEDICARE

## 2023-04-03 VITALS
SYSTOLIC BLOOD PRESSURE: 148 MMHG | TEMPERATURE: 96.6 F | BODY MASS INDEX: 27.16 KG/M2 | DIASTOLIC BLOOD PRESSURE: 70 MMHG | HEIGHT: 65 IN | OXYGEN SATURATION: 97 % | RESPIRATION RATE: 16 BRPM | WEIGHT: 163 LBS | HEART RATE: 69 BPM

## 2023-04-03 DIAGNOSIS — H81.13 BPV (BENIGN POSITIONAL VERTIGO), BILATERAL: Primary | ICD-10-CM

## 2023-04-03 PROCEDURE — 99213 OFFICE O/P EST LOW 20 MIN: CPT | Performed by: FAMILY MEDICINE

## 2023-04-03 PROCEDURE — 3078F DIAST BP <80 MM HG: CPT | Performed by: FAMILY MEDICINE

## 2023-04-03 PROCEDURE — 3077F SYST BP >= 140 MM HG: CPT | Performed by: FAMILY MEDICINE

## 2023-04-03 RX ORDER — MECLIZINE HYDROCHLORIDE 25 MG/1
25 TABLET ORAL 3 TIMES DAILY PRN
Qty: 90 TABLET | Refills: 0 | Status: SHIPPED | OUTPATIENT
Start: 2023-04-03

## 2023-04-03 NOTE — PROGRESS NOTES
Subjective   Gerda Bolden is a 67 y.o. female. Presents today for   Chief Complaint   Patient presents with   • Dizziness   • Headache     C.o vertigo x1 week       Dizziness  This is a new problem. The current episode started in the past 7 days. The problem occurs intermittently. The problem has been waxing and waning. Associated symptoms include headaches and vertigo (vague on description of vertigo or not, feels liek ears full). Pertinent negatives include no congestion, coughing, fever or visual change. Associated symptoms comments: Has had heart checked out and ok;   Had similar episode 1 year ago;   Hearing loss has progressively worsened.      covid negative . Exacerbated by: quick head movmenets.       Review of Systems   Constitutional: Negative for fever.   HENT: Negative for congestion.    Respiratory: Negative for cough.    Neurological: Positive for dizziness, vertigo (vague on description of vertigo or not, feels liek ears full) and headaches.       Patient Active Problem List   Diagnosis   • Abnormal CT scan of heart   • Abnormal liver function tests   • Adhesive capsulitis of left shoulder   • Anxiety   • Arteriosclerosis of coronary artery   • Benign essential hypertension   • Bilateral hearing loss   • Bradycardia   • Chest pain due to myocardial ischemia   • Chest pain   • Ischemic chest pain   • Chronic obstructive pulmonary disease   • Constipation   • Diabetes mellitus   • Disequilibrium   • Dyslipidemia   • Ex-smoker   • Fatigue   • Gastroesophageal reflux disease with hiatal hernia   • Hyperlipidemia   • Hypothyroidism   • Immunization due   • Increased glucose level   • Melanocytic nevus   • Mixed anxiety and depressive disorder   • Near syncope   • Paroxysmal atrial fibrillation   • Postural hypotension   • Breast cancer screening   • Encounter for follow-up examination after completed treatment for conditions other than malignant neoplasm   • Preop cardiovascular exam   • Presence of  coronary angioplasty implant and graft   • Rectal hemorrhage   • Shoulder pain   • Skin lesion   • Syncope   • Thickened endometrium   • Tobacco user   • Unstable angina   • Vitamin B12 deficiency   • Vitamin D deficiency       Social History     Socioeconomic History   • Marital status:    Tobacco Use   • Smoking status: Former     Packs/day: 1.50     Years: 46.00     Pack years: 69.00     Types: Cigarettes     Start date:      Quit date:      Years since quittin.2   • Smokeless tobacco: Never   Vaping Use   • Vaping Use: Never used   Substance and Sexual Activity   • Alcohol use: Never   • Drug use: Never   • Sexual activity: Defer       Allergies   Allergen Reactions   • Ezetimibe Unknown - Low Severity   • Statins Other (See Comments)     Muscle aches       Current Outpatient Medications on File Prior to Visit   Medication Sig Dispense Refill   • Alirocumab (Praluent) 75 MG/ML solution auto-injector Inject 1 mL under the skin into the appropriate area as directed Every 14 (Fourteen) Days. 2.24 mL 2   • aspirin 81 MG EC tablet Take 1 tablet by mouth Daily.     • Cholecalciferol (Vitamin D3) 50 MCG (2000 UT) capsule TAKE 1 CAPSULE BY MOUTH EVERY DAY 90 capsule 0   • dexlansoprazole (DEXILANT) 60 MG capsule Take 1 capsule by mouth Daily.     • famotidine (PEPCID) 40 MG tablet Take 1 tablet by mouth Daily.     • isosorbide mononitrate (IMDUR) 30 MG 24 hr tablet Take 1 tablet by mouth Daily.     • levothyroxine (SYNTHROID, LEVOTHROID) 75 MCG tablet Take 1 tablet by mouth Daily. 20 tablet 0   • metFORMIN (GLUCOPHAGE) 500 MG tablet TAKE 1 TABLET BY MOUTH TWICE DAILY WITH MEALS 180 tablet 0   • metoprolol succinate XL (TOPROL-XL) 25 MG 24 hr tablet Take 1 tablet by mouth Daily.     • Vitamin E 180 MG (400 UNIT) capsule capsule TAKE 1 CAPSULE BY MOUTH EVERY DAY 90 capsule 0   • [DISCONTINUED] Systane Complete 0.6 % solution SHAKE LIQUID WELL AND INSTILL 1 DROP INTO IN EACH EYE FOUR TIMES DAILY (Patient  "not taking: Reported on 4/3/2023)       No current facility-administered medications on file prior to visit.       Objective   Vitals:    04/03/23 1556   BP: 148/70   Pulse: 69   Resp: 16   Temp: 96.6 °F (35.9 °C)   TempSrc: Temporal   SpO2: 97%   Weight: 73.9 kg (163 lb)   Height: 165.1 cm (65\")   PainSc: 0-No pain     Body mass index is 27.12 kg/m².    Physical Exam  Vitals and nursing note reviewed.   Constitutional:       Appearance: She is well-developed.   HENT:      Head: Normocephalic and atraumatic.      Right Ear: Decreased hearing noted. Tympanic membrane is scarred. Tympanic membrane is not injected.      Left Ear: Decreased hearing noted. Tympanic membrane is scarred. Tympanic membrane is not injected.   Eyes:      Extraocular Movements: Extraocular movements intact.      Pupils: Pupils are equal, round, and reactive to light.   Neck:      Thyroid: No thyromegaly.      Vascular: No JVD.   Cardiovascular:      Rate and Rhythm: Normal rate and regular rhythm.      Heart sounds: Normal heart sounds. No murmur heard.    No friction rub. No gallop.   Pulmonary:      Effort: Pulmonary effort is normal. No respiratory distress.      Breath sounds: Normal breath sounds. No wheezing or rales.   Abdominal:      General: Bowel sounds are normal. There is no distension.      Palpations: Abdomen is soft.      Tenderness: There is no abdominal tenderness. There is no guarding or rebound.   Musculoskeletal:      Cervical back: Neck supple.   Skin:     General: Skin is warm and dry.   Neurological:      Mental Status: She is alert.      Cranial Nerves: No cranial nerve deficit.      Gait: Gait normal.      Deep Tendon Reflexes: Reflexes normal.      Comments: +candy stewart pike b/l with horizontal nystagmus   Psychiatric:         Behavior: Behavior normal.         Assessment & Plan   Diagnoses and all orders for this visit:    1. BPV (benign positional vertigo), bilateral (Primary)  -     meclizine (ANTIVERT) 25 MG tablet; " Take 1 tablet by mouth 3 (Three) Times a Day As Needed for Dizziness.  Dispense: 90 tablet; Refill: 0    I gave home epley maneuver to try and d/w pathophysiology;   Go ed if any focal neuro deficits         -Follow up: Prn - RTC if worse or no improvement.

## 2023-04-05 ENCOUNTER — PATIENT ROUNDING (BHMG ONLY) (OUTPATIENT)
Dept: FAMILY MEDICINE CLINIC | Facility: CLINIC | Age: 68
End: 2023-04-05
Payer: MEDICARE

## 2023-04-05 NOTE — PROGRESS NOTES
A [facility] message has been sent to the patient for PATIENT ROUNDING with [facility].

## 2023-04-06 PROBLEM — K63.5 POLYP OF COLON: Status: ACTIVE | Noted: 2022-04-26

## 2023-04-06 PROBLEM — Z86.010 PERSONAL HISTORY OF COLONIC POLYPS: Status: ACTIVE | Noted: 2023-04-06

## 2023-04-06 PROBLEM — R74.8 ABNORMAL LEVELS OF OTHER SERUM ENZYMES: Status: ACTIVE | Noted: 2018-03-31

## 2023-04-06 PROBLEM — K21.00 GASTRO-ESOPHAGEAL REFLUX DISEASE WITH ESOPHAGITIS: Status: ACTIVE | Noted: 2023-04-06

## 2023-04-06 PROBLEM — K22.70 BARRETT'S ESOPHAGUS: Status: ACTIVE | Noted: 2023-04-06

## 2023-04-06 PROBLEM — R13.10 DYSPHAGIA: Status: ACTIVE | Noted: 2022-04-26

## 2023-04-06 PROBLEM — K57.30 DVRTCLOS OF LG INT W/O PERFORATION OR ABSCESS W/O BLEEDING: Status: ACTIVE | Noted: 2022-04-26

## 2023-04-06 PROBLEM — Z79.01 LONG TERM (CURRENT) USE OF ANTICOAGULANTS: Status: ACTIVE | Noted: 2023-04-06

## 2023-04-06 PROBLEM — Z86.0100 PERSONAL HISTORY OF COLONIC POLYPS: Status: ACTIVE | Noted: 2023-04-06

## 2023-04-06 PROBLEM — E83.119 HEMOCHROMATOSIS, UNSPECIFIED: Status: ACTIVE | Noted: 2023-04-06

## 2023-04-06 PROBLEM — R10.11 RUQ PAIN: Status: ACTIVE | Noted: 2023-04-06

## 2023-04-06 PROBLEM — K75.81 NONALCOHOLIC STEATOHEPATITIS (NASH): Status: ACTIVE | Noted: 2023-04-06

## 2023-04-06 PROBLEM — R68.81 EARLY SATIETY: Status: ACTIVE | Noted: 2023-04-06

## 2023-04-06 PROBLEM — R11.0 NAUSEA: Status: ACTIVE | Noted: 2017-04-06

## 2023-04-17 DIAGNOSIS — E78.2 MIXED HYPERLIPIDEMIA: ICD-10-CM

## 2023-04-17 DIAGNOSIS — E03.9 HYPOTHYROIDISM, UNSPECIFIED TYPE: ICD-10-CM

## 2023-04-17 RX ORDER — ALIROCUMAB 75 MG/ML
75 INJECTION, SOLUTION SUBCUTANEOUS
Qty: 2.24 ML | Refills: 2 | Status: SHIPPED | OUTPATIENT
Start: 2023-04-17

## 2023-04-17 RX ORDER — LEVOTHYROXINE SODIUM 0.07 MG/1
75 TABLET ORAL DAILY
Qty: 90 TABLET | Refills: 1 | Status: SHIPPED | OUTPATIENT
Start: 2023-04-17 | End: 2023-04-20 | Stop reason: SDUPTHER

## 2023-04-17 RX ORDER — LEVOTHYROXINE SODIUM 75 MCG
TABLET ORAL
Qty: 90 TABLET | Refills: 1 | Status: SHIPPED | OUTPATIENT
Start: 2023-04-17 | End: 2023-04-17 | Stop reason: SDUPTHER

## 2023-04-20 DIAGNOSIS — E03.9 HYPOTHYROIDISM, UNSPECIFIED TYPE: ICD-10-CM

## 2023-04-20 RX ORDER — LEVOTHYROXINE SODIUM 0.07 MG/1
75 TABLET ORAL DAILY
Qty: 90 TABLET | Refills: 1 | Status: SHIPPED | OUTPATIENT
Start: 2023-04-20 | End: 2023-04-20 | Stop reason: SDUPTHER

## 2023-04-20 RX ORDER — LEVOTHYROXINE SODIUM 0.07 MG/1
75 TABLET ORAL DAILY
Qty: 15 TABLET | Refills: 0 | Status: SHIPPED | OUTPATIENT
Start: 2023-04-20

## 2023-05-03 ENCOUNTER — OFFICE (OUTPATIENT)
Dept: URBAN - METROPOLITAN AREA CLINIC 76 | Facility: CLINIC | Age: 68
End: 2023-05-03
Payer: OTHER GOVERNMENT

## 2023-05-03 VITALS
DIASTOLIC BLOOD PRESSURE: 83 MMHG | HEIGHT: 65 IN | WEIGHT: 164 LBS | SYSTOLIC BLOOD PRESSURE: 164 MMHG | HEART RATE: 68 BPM | SYSTOLIC BLOOD PRESSURE: 154 MMHG | OXYGEN SATURATION: 97 % | DIASTOLIC BLOOD PRESSURE: 81 MMHG

## 2023-05-03 DIAGNOSIS — E83.119 HEMOCHROMATOSIS, UNSPECIFIED: ICD-10-CM

## 2023-05-03 DIAGNOSIS — K59.00 CONSTIPATION, UNSPECIFIED: ICD-10-CM

## 2023-05-03 DIAGNOSIS — K22.70 BARRETT'S ESOPHAGUS WITHOUT DYSPLASIA: ICD-10-CM

## 2023-05-03 DIAGNOSIS — R13.10 DYSPHAGIA, UNSPECIFIED: ICD-10-CM

## 2023-05-03 DIAGNOSIS — Z86.010 PERSONAL HISTORY OF COLONIC POLYPS: ICD-10-CM

## 2023-05-03 DIAGNOSIS — K75.81 NONALCOHOLIC STEATOHEPATITIS (NASH): ICD-10-CM

## 2023-05-03 DIAGNOSIS — K21.9 GASTRO-ESOPHAGEAL REFLUX DISEASE WITHOUT ESOPHAGITIS: ICD-10-CM

## 2023-05-03 PROCEDURE — 99214 OFFICE O/P EST MOD 30 MIN: CPT | Performed by: INTERNAL MEDICINE

## 2023-05-03 RX ORDER — DEXLANSOPRAZOLE 60 MG/1
120 CAPSULE, DELAYED RELEASE ORAL
Qty: 180 | Refills: 3 | Status: ACTIVE
Start: 2019-07-19

## 2023-06-12 DIAGNOSIS — E11.9 TYPE 2 DIABETES MELLITUS WITHOUT COMPLICATION, WITHOUT LONG-TERM CURRENT USE OF INSULIN: ICD-10-CM

## 2023-09-06 DIAGNOSIS — E78.2 MIXED HYPERLIPIDEMIA: ICD-10-CM

## 2023-09-06 RX ORDER — ALIROCUMAB 75 MG/ML
75 INJECTION, SOLUTION SUBCUTANEOUS
Qty: 2.24 ML | Refills: 2 | Status: SHIPPED | OUTPATIENT
Start: 2023-09-06 | End: 2023-09-08 | Stop reason: SDUPTHER

## 2023-09-08 DIAGNOSIS — E78.2 MIXED HYPERLIPIDEMIA: ICD-10-CM

## 2023-09-08 RX ORDER — ALIROCUMAB 75 MG/ML
75 INJECTION, SOLUTION SUBCUTANEOUS
Qty: 2.24 ML | Refills: 2 | Status: SHIPPED | OUTPATIENT
Start: 2023-09-08

## 2023-11-21 DIAGNOSIS — E03.9 HYPOTHYROIDISM, UNSPECIFIED TYPE: ICD-10-CM

## 2023-11-22 RX ORDER — LEVOTHYROXINE SODIUM 0.07 MG/1
75 TABLET ORAL DAILY
Qty: 15 TABLET | Refills: 0 | OUTPATIENT
Start: 2023-11-22

## 2023-11-29 ENCOUNTER — TELEPHONE (OUTPATIENT)
Dept: FAMILY MEDICINE CLINIC | Facility: CLINIC | Age: 68
End: 2023-11-29
Payer: MEDICARE

## 2023-11-29 DIAGNOSIS — E11.9 TYPE 2 DIABETES MELLITUS WITHOUT COMPLICATION, WITHOUT LONG-TERM CURRENT USE OF INSULIN: Primary | ICD-10-CM

## 2023-11-29 NOTE — TELEPHONE ENCOUNTER
11/29/2023      Gerda Bolden  [address] [id number]          Dear:  Gerda Bolden        Our records show that it's time for your diabetic labs.   Please come to our office between 8:00 AM to 11:45 AM and 1 Pm to 3:30pm fasting for 8 hours to complete.  If you have had labs done elsewhere, please call or my chart message our office where and when you have had.   If you have an up coming appointment, we can just do the labs at that time.      If you should have any questions, please feel free to let us know.    Sincerely,    Siddharth Lamas DO, MS

## 2023-12-04 ENCOUNTER — OFFICE VISIT (OUTPATIENT)
Dept: FAMILY MEDICINE CLINIC | Facility: CLINIC | Age: 68
End: 2023-12-04
Payer: MEDICARE

## 2023-12-04 VITALS
HEIGHT: 65 IN | WEIGHT: 163.4 LBS | HEART RATE: 61 BPM | DIASTOLIC BLOOD PRESSURE: 72 MMHG | SYSTOLIC BLOOD PRESSURE: 126 MMHG | BODY MASS INDEX: 27.22 KG/M2 | OXYGEN SATURATION: 97 %

## 2023-12-04 DIAGNOSIS — E55.9 VITAMIN D DEFICIENCY: ICD-10-CM

## 2023-12-04 DIAGNOSIS — Z23 NEED FOR PROPHYLACTIC VACCINATION WITH STREPTOCOCCUS PNEUMONIAE (PNEUMOCOCCUS) AND INFLUENZA VACCINES: ICD-10-CM

## 2023-12-04 DIAGNOSIS — R52 PAIN AND TENDERNESS: ICD-10-CM

## 2023-12-04 DIAGNOSIS — E03.2 HYPOTHYROIDISM DUE TO MEDICATION: ICD-10-CM

## 2023-12-04 DIAGNOSIS — E11.9 DIABETES MELLITUS WITHOUT COMPLICATION: Primary | ICD-10-CM

## 2023-12-04 DIAGNOSIS — E78.2 MIXED HYPERLIPIDEMIA: ICD-10-CM

## 2023-12-04 DIAGNOSIS — E66.01 MORBID (SEVERE) OBESITY DUE TO EXCESS CALORIES: ICD-10-CM

## 2023-12-04 DIAGNOSIS — E53.8 VITAMIN B12 DEFICIENCY: ICD-10-CM

## 2023-12-04 DIAGNOSIS — N64.9 DISORDER OF BREAST, UNSPECIFIED: ICD-10-CM

## 2023-12-04 DIAGNOSIS — Z12.31 ENCOUNTER FOR SCREENING MAMMOGRAM FOR MALIGNANT NEOPLASM OF BREAST: ICD-10-CM

## 2023-12-04 DIAGNOSIS — I10 PRIMARY HYPERTENSION: ICD-10-CM

## 2023-12-04 RX ORDER — ALIROCUMAB 75 MG/ML
75 INJECTION, SOLUTION SUBCUTANEOUS
Qty: 2.24 ML | Refills: 2 | Status: SHIPPED | OUTPATIENT
Start: 2023-12-04 | End: 2023-12-05 | Stop reason: SDUPTHER

## 2023-12-04 RX ORDER — ACETAMINOPHEN 160 MG
2000 TABLET,DISINTEGRATING ORAL DAILY
Qty: 90 CAPSULE | Refills: 0 | Status: SHIPPED | OUTPATIENT
Start: 2023-12-04

## 2023-12-04 RX ORDER — METOPROLOL SUCCINATE 25 MG/1
25 TABLET, EXTENDED RELEASE ORAL DAILY
Qty: 180 TABLET | Refills: 1 | Status: SHIPPED | OUTPATIENT
Start: 2023-12-04

## 2023-12-04 RX ORDER — VITAMIN E 268 MG
1 CAPSULE ORAL DAILY
Qty: 90 CAPSULE | Refills: 1 | Status: SHIPPED | OUTPATIENT
Start: 2023-12-04

## 2023-12-04 RX ORDER — LEVOTHYROXINE SODIUM 0.07 MG/1
75 TABLET ORAL DAILY
Qty: 90 TABLET | Refills: 1 | Status: SHIPPED | OUTPATIENT
Start: 2023-12-04 | End: 2023-12-05 | Stop reason: SDUPTHER

## 2023-12-04 NOTE — PROGRESS NOTES
Subjective   Gerda Bolden is a 68 y.o. female. Presents today for follow up on blood glucose, lipds, hypertension, fatigue, vitamin deficiency of B-12 an Vitamin D.  Chief Complaint   Patient presents with    Med Management     Has not had labs is a while  Fasting for Labs   Need Refills       History Of Present Illness Gerda c/o bilateral breast tenderness and pain      Patient Active Problem List   Diagnosis    Abnormal CT scan of heart    Abnormal levels of other serum enzymes    Adhesive capsulitis of left shoulder    Anxiety    Arteriosclerosis of coronary artery    Benign essential hypertension    Bilateral hearing loss    Bradycardia    Chest pain due to myocardial ischemia    Chest pain    Ischemic chest pain    Chronic obstructive pulmonary disease    Nausea    Diabetes mellitus    Disequilibrium    Dyslipidemia    Ex-smoker    Fatigue    Hiatal hernia    Hyperlipidemia    Thyroid disease    Immunization due    Increased glucose level    Melanocytic nevus    Mixed anxiety and depressive disorder    High blood pressure    Atrial fibrillation    Postural hypotension    Breast cancer screening    Encounter for follow-up examination after completed treatment for conditions other than malignant neoplasm    Preop cardiovascular exam    Presence of coronary angioplasty implant and graft    Rectal hemorrhage    Shoulder pain    Skin lesion    Syncope    Thickened endometrium    Tobacco user    Unstable angina    Vitamin B12 deficiency    Vitamin D deficiency    Peña's esophagus    Dvrtclos of lg int w/o perforation or abscess w/o bleeding    Early satiety    Gastro-esophageal reflux disease with esophagitis    Hemochromatosis, unspecified    Long term (current) use of anticoagulants    Nonalcoholic steatohepatitis (BELLAMY)    Polyp of colon    Personal history of colonic polyps    Dysphagia    RUQ pain       Social History     Socioeconomic History    Marital status:    Tobacco Use    Smoking status: Former  "    Packs/day: 1.50     Years: 46.00     Additional pack years: 0.00     Total pack years: 69.00     Types: Cigarettes     Start date:      Quit date: 2017     Years since quittin.9    Smokeless tobacco: Never   Vaping Use    Vaping Use: Never used   Substance and Sexual Activity    Alcohol use: Never    Drug use: Never    Sexual activity: Defer       Allergies   Allergen Reactions    Ezetimibe Unknown - Low Severity    Statins Other (See Comments)     Muscle aches       Current Outpatient Medications on File Prior to Visit   Medication Sig Dispense Refill    aspirin 81 MG EC tablet Take 1 tablet by mouth Daily.      dexlansoprazole (DEXILANT) 60 MG capsule Take 1 capsule by mouth Daily.      isosorbide mononitrate (IMDUR) 30 MG 24 hr tablet Take 1 tablet by mouth Daily.      meclizine (ANTIVERT) 25 MG tablet Take 1 tablet by mouth 3 (Three) Times a Day As Needed for Dizziness. 90 tablet 0     No current facility-administered medications on file prior to visit.       Objective   Vitals:    23 1022   BP: 126/72   BP Location: Right arm   Patient Position: Sitting   Cuff Size: Adult   Pulse: 61   SpO2: 97%   Weight: 74.1 kg (163 lb 6.4 oz)   Height: 165.1 cm (65\")     Body mass index is 27.19 kg/m².    Physical Exam  Constitutional:       Appearance: Normal appearance.   HENT:      Right Ear: Tympanic membrane, ear canal and external ear normal.      Left Ear: Tympanic membrane, ear canal and external ear normal.      Mouth/Throat:      Mouth: Mucous membranes are moist.   Eyes:      Pupils: Pupils are equal, round, and reactive to light.   Cardiovascular:      Rate and Rhythm: Normal rate and regular rhythm.   Pulmonary:      Effort: Pulmonary effort is normal.      Breath sounds: Normal breath sounds.   Abdominal:      General: Abdomen is flat. Bowel sounds are normal.      Palpations: Abdomen is soft.   Musculoskeletal:         General: Normal range of motion.   Skin:     General: Skin is warm and " dry.      Capillary Refill: Capillary refill takes less than 2 seconds.   Neurological:      General: No focal deficit present.      Mental Status: She is alert.   Psychiatric:         Mood and Affect: Mood normal.       Procedures     Assessment & Plan   Diagnoses and all orders for this visit:    1. Diabetes mellitus without complication (Primary)  -     CBC & Differential  -     Comprehensive Metabolic Panel  -     metFORMIN (GLUCOPHAGE) 500 MG tablet; Take 1 tablet by mouth 2 (Two) Times a Day With Meals.  Dispense: 180 tablet; Refill: 1    2. Mixed hyperlipidemia  -     Lipid Panel  -     Vitamin E 180 MG (400 UNIT) capsule capsule; Take 1 capsule by mouth Daily.  Dispense: 90 capsule; Refill: 1  -     Discontinue: Alirocumab (Praluent) 75 MG/ML solution auto-injector; Inject 1 mL under the skin into the appropriate area as directed Every 14 (Fourteen) Days.  Dispense: 2.24 mL; Refill: 2    3. Hypothyroidism due to medication  -     Thyroid Panel With TSH  -     levothyroxine (Synthroid) 100 MCG tablet; Take 1 tablet by mouth Daily.  Dispense: 90 tablet; Refill: 1    4. Vitamin B12 deficiency  -     Vitamin B12    5. Vitamin D deficiency  -     Vitamin D,25-Hydroxy  -     Cholecalciferol (Vitamin D3) 50 MCG (2000 UT) capsule; Take 1 capsule by mouth Daily.  Dispense: 90 capsule; Refill: 0    6. Morbid (severe) obesity due to excess calories  -     Ambulatory Referral to Sleep Medicine    7. Primary hypertension  -     metoprolol succinate XL (Toprol XL) 25 MG 24 hr tablet; Take 1 tablet by mouth Daily.  Dispense: 180 tablet; Refill: 1    8. Encounter for screening mammogram for malignant neoplasm of breast  -     Mammo screening digital tomosynthesis bilateral w CAD; Future    9. preventative health care  -     Pneumococcal Conjugate Vaccine 20-Valent All  -     Fluzone >6 Months ([id number])    Other orders  -     Discontinue: levothyroxine (Synthroid) 75 MCG tablet; Take 1 tablet by mouth Daily.  Dispense: 90  tablet; Refill: 1         Discussed Care Gaps, ordered referrals and encouraged vaccination updates.       - Pt agrees with plan of care and denies further questions/concerns today  - This document is intended for medical expert use only. Persons  reading this document without medical staff guidance may result in misinterpretation and unintended morbidity     Go to the ER for any possible life-threatening symptoms such as chest pain or shortness of air.      Please allow 3-5 business days for recommendations based on new results      I personally spent time with this patient, preparing for the visit, reviewing tests, obtaining and/or reviewing a separately obtained history, performing a medically appropriate examination and/or evaluation, counseling and educating the patient/family/caregiver, ordering medications,  documenting information in the medical record and indepentently interpreting results.          Return in about 6 months (around 6/4/2024) for Next scheduled follow up.

## 2023-12-05 DIAGNOSIS — E78.2 MIXED HYPERLIPIDEMIA: ICD-10-CM

## 2023-12-05 DIAGNOSIS — E03.9 HYPOTHYROIDISM, UNSPECIFIED TYPE: ICD-10-CM

## 2023-12-05 LAB
25(OH)D3+25(OH)D2 SERPL-MCNC: 69 NG/ML (ref 30–100)
ALBUMIN SERPL-MCNC: 4.7 G/DL (ref 3.5–5.2)
ALBUMIN/GLOB SERPL: 2.2 G/DL
ALP SERPL-CCNC: 93 U/L (ref 39–117)
ALT SERPL-CCNC: 61 U/L (ref 1–33)
AST SERPL-CCNC: 49 U/L (ref 1–32)
BASOPHILS # BLD AUTO: 0.04 10*3/MM3 (ref 0–0.2)
BASOPHILS NFR BLD AUTO: 0.7 % (ref 0–1.5)
BILIRUB SERPL-MCNC: 0.6 MG/DL (ref 0–1.2)
BUN SERPL-MCNC: 10 MG/DL (ref 8–23)
BUN/CREAT SERPL: 10.5 (ref 7–25)
CALCIUM SERPL-MCNC: 10.6 MG/DL (ref 8.6–10.5)
CHLORIDE SERPL-SCNC: 101 MMOL/L (ref 98–107)
CHOLEST SERPL-MCNC: 183 MG/DL (ref 0–200)
CO2 SERPL-SCNC: 29.7 MMOL/L (ref 22–29)
CREAT SERPL-MCNC: 0.95 MG/DL (ref 0.57–1)
EGFRCR SERPLBLD CKD-EPI 2021: 65.4 ML/MIN/1.73
EOSINOPHIL # BLD AUTO: 0.18 10*3/MM3 (ref 0–0.4)
EOSINOPHIL NFR BLD AUTO: 3 % (ref 0.3–6.2)
ERYTHROCYTE [DISTWIDTH] IN BLOOD BY AUTOMATED COUNT: 12.8 % (ref 12.3–15.4)
FT4I SERPL CALC-MCNC: 2.1 (ref 1.2–4.9)
GLOBULIN SER CALC-MCNC: 2.1 GM/DL
GLUCOSE SERPL-MCNC: 145 MG/DL (ref 65–99)
HCT VFR BLD AUTO: 42.1 % (ref 34–46.6)
HDLC SERPL-MCNC: 55 MG/DL (ref 40–60)
HGB BLD-MCNC: 14.5 G/DL (ref 12–15.9)
IMM GRANULOCYTES # BLD AUTO: 0.03 10*3/MM3 (ref 0–0.05)
IMM GRANULOCYTES NFR BLD AUTO: 0.5 % (ref 0–0.5)
LDLC SERPL CALC-MCNC: 96 MG/DL (ref 0–100)
LYMPHOCYTES # BLD AUTO: 2.09 10*3/MM3 (ref 0.7–3.1)
LYMPHOCYTES NFR BLD AUTO: 34.4 % (ref 19.6–45.3)
MCH RBC QN AUTO: 31.3 PG (ref 26.6–33)
MCHC RBC AUTO-ENTMCNC: 34.4 G/DL (ref 31.5–35.7)
MCV RBC AUTO: 90.9 FL (ref 79–97)
MONOCYTES # BLD AUTO: 0.46 10*3/MM3 (ref 0.1–0.9)
MONOCYTES NFR BLD AUTO: 7.6 % (ref 5–12)
NEUTROPHILS # BLD AUTO: 3.27 10*3/MM3 (ref 1.7–7)
NEUTROPHILS NFR BLD AUTO: 53.8 % (ref 42.7–76)
NRBC BLD AUTO-RTO: 0 /100 WBC (ref 0–0.2)
PLATELET # BLD AUTO: 220 10*3/MM3 (ref 140–450)
POTASSIUM SERPL-SCNC: 4.6 MMOL/L (ref 3.5–5.2)
PROT SERPL-MCNC: 6.8 G/DL (ref 6–8.5)
RBC # BLD AUTO: 4.63 10*6/MM3 (ref 3.77–5.28)
SODIUM SERPL-SCNC: 140 MMOL/L (ref 136–145)
T3RU NFR SERPL: 22 % (ref 24–39)
T4 SERPL-MCNC: 9.6 UG/DL (ref 4.5–12)
TRIGL SERPL-MCNC: 188 MG/DL (ref 0–150)
TSH SERPL DL<=0.005 MIU/L-ACNC: 6.14 UIU/ML (ref 0.45–4.5)
VIT B12 SERPL-MCNC: 244 PG/ML (ref 211–946)
VLDLC SERPL CALC-MCNC: 32 MG/DL (ref 5–40)
WBC # BLD AUTO: 6.07 10*3/MM3 (ref 3.4–10.8)

## 2023-12-05 RX ORDER — LEVOTHYROXINE SODIUM 0.07 MG/1
75 TABLET ORAL DAILY
Qty: 90 TABLET | Refills: 1 | Status: SHIPPED | OUTPATIENT
Start: 2023-12-05 | End: 2023-12-05

## 2023-12-05 RX ORDER — LEVOTHYROXINE SODIUM 0.1 MG/1
100 TABLET ORAL DAILY
Qty: 90 TABLET | Refills: 1 | Status: SHIPPED | OUTPATIENT
Start: 2023-12-05

## 2023-12-05 RX ORDER — ALIROCUMAB 75 MG/ML
75 INJECTION, SOLUTION SUBCUTANEOUS
Qty: 2.24 ML | Refills: 2 | Status: SHIPPED | OUTPATIENT
Start: 2023-12-05

## 2023-12-11 ENCOUNTER — TELEPHONE (OUTPATIENT)
Dept: FAMILY MEDICINE CLINIC | Facility: CLINIC | Age: 68
End: 2023-12-11
Payer: MEDICARE

## 2023-12-11 NOTE — TELEPHONE ENCOUNTER
12/09-  PATIENT FOR MAMMOGRAM. SHE IS HAVING BREAST PAIN AND TENDERNESS. ROUTING BACK TO [facility] FOR DIAGNOSTIC MAMMOGRAM WITH BREAST ULTRASOUND ORDERS.     Please advise once new orders are placed.

## 2024-01-03 ENCOUNTER — HOSPITAL ENCOUNTER (OUTPATIENT)
Dept: ULTRASOUND IMAGING | Facility: HOSPITAL | Age: 69
Discharge: HOME | End: 2024-01-03
Payer: MEDICARE

## 2024-01-03 ENCOUNTER — HOSPITAL ENCOUNTER (OUTPATIENT)
Dept: MAMMOGRAPHY | Facility: HOSPITAL | Age: 69
Discharge: HOME | End: 2024-01-03
Payer: MEDICARE

## 2024-01-03 DIAGNOSIS — N64.9 DISORDER OF BREAST, UNSPECIFIED: ICD-10-CM

## 2024-01-03 DIAGNOSIS — R52 PAIN AND TENDERNESS: ICD-10-CM

## 2024-01-03 PROCEDURE — 77066 DX MAMMO INCL CAD BI: CPT

## 2024-01-03 PROCEDURE — G0279 TOMOSYNTHESIS, MAMMO: HCPCS

## 2024-01-03 PROCEDURE — 76642 ULTRASOUND BREAST LIMITED: CPT

## 2024-01-04 DIAGNOSIS — Z12.31 ENCOUNTER FOR SCREENING MAMMOGRAM FOR MALIGNANT NEOPLASM OF BREAST: ICD-10-CM

## 2024-01-04 DIAGNOSIS — N60.02 SOLITARY CYST OF LEFT BREAST: Primary | ICD-10-CM

## 2024-02-05 ENCOUNTER — OFFICE (OUTPATIENT)
Dept: URBAN - METROPOLITAN AREA CLINIC 76 | Facility: CLINIC | Age: 69
End: 2024-02-05

## 2024-02-05 VITALS — HEIGHT: 65 IN | WEIGHT: 162 LBS

## 2024-02-05 DIAGNOSIS — R74.8 ABNORMAL LEVELS OF OTHER SERUM ENZYMES: ICD-10-CM

## 2024-02-05 DIAGNOSIS — K22.70 BARRETT'S ESOPHAGUS WITHOUT DYSPLASIA: ICD-10-CM

## 2024-02-05 DIAGNOSIS — K59.00 CONSTIPATION, UNSPECIFIED: ICD-10-CM

## 2024-02-05 DIAGNOSIS — K21.9 GASTRO-ESOPHAGEAL REFLUX DISEASE WITHOUT ESOPHAGITIS: ICD-10-CM

## 2024-02-05 DIAGNOSIS — R13.10 DYSPHAGIA, UNSPECIFIED: ICD-10-CM

## 2024-02-05 PROCEDURE — 99213 OFFICE O/P EST LOW 20 MIN: CPT | Performed by: INTERNAL MEDICINE

## 2024-02-05 RX ORDER — FAMOTIDINE 40 MG/1
40 TABLET, FILM COATED ORAL
Qty: 90 | Refills: 4 | Status: ACTIVE
Start: 2022-04-01

## 2024-02-05 RX ORDER — DEXLANSOPRAZOLE 60 MG/1
120 CAPSULE, DELAYED RELEASE ORAL
Qty: 180 | Refills: 3 | Status: ACTIVE
Start: 2019-07-19

## 2024-02-06 DIAGNOSIS — H81.13 BPV (BENIGN POSITIONAL VERTIGO), BILATERAL: ICD-10-CM

## 2024-02-06 RX ORDER — MECLIZINE HYDROCHLORIDE 25 MG/1
25 TABLET ORAL 3 TIMES DAILY PRN
Qty: 90 TABLET | Refills: 0 | Status: SHIPPED | OUTPATIENT
Start: 2024-02-06

## 2024-03-02 DIAGNOSIS — I10 PRIMARY HYPERTENSION: ICD-10-CM

## 2024-03-03 RX ORDER — METOPROLOL SUCCINATE 25 MG/1
25 TABLET, EXTENDED RELEASE ORAL DAILY
Qty: 180 TABLET | Refills: 1 | Status: SHIPPED | OUTPATIENT
Start: 2024-03-03

## 2024-03-20 ENCOUNTER — PATIENT MESSAGE (OUTPATIENT)
Dept: FAMILY MEDICINE CLINIC | Facility: CLINIC | Age: 69
End: 2024-03-20
Payer: MEDICARE

## 2024-03-20 DIAGNOSIS — G47.30 SLEEP APNEA, UNSPECIFIED TYPE: Primary | ICD-10-CM

## 2024-03-20 NOTE — TELEPHONE ENCOUNTER
From: Gerda Bolden  To: Ayesha Fajardo  Sent: 3/20/2024 11:20 AM EDT  Subject: Sleep apnea     My heart doctor wants me to reschedule the sleep apnea test    Thank you

## 2024-04-15 DIAGNOSIS — E78.2 MIXED HYPERLIPIDEMIA: ICD-10-CM

## 2024-04-15 RX ORDER — ALIROCUMAB 75 MG/ML
75 INJECTION, SOLUTION SUBCUTANEOUS
Qty: 2.24 ML | Refills: 2 | Status: SHIPPED | OUTPATIENT
Start: 2024-04-15 | End: 2024-04-18 | Stop reason: SDUPTHER

## 2024-04-18 DIAGNOSIS — E78.2 MIXED HYPERLIPIDEMIA: ICD-10-CM

## 2024-04-19 RX ORDER — ALIROCUMAB 75 MG/ML
75 INJECTION, SOLUTION SUBCUTANEOUS
Qty: 2.24 ML | Refills: 2 | Status: SHIPPED | OUTPATIENT
Start: 2024-04-19

## 2024-04-26 ENCOUNTER — OFFICE VISIT (OUTPATIENT)
Dept: FAMILY MEDICINE CLINIC | Facility: CLINIC | Age: 69
End: 2024-04-26
Payer: MEDICARE

## 2024-04-26 VITALS
BODY MASS INDEX: 27.09 KG/M2 | HEIGHT: 65 IN | WEIGHT: 162.6 LBS | OXYGEN SATURATION: 95 % | HEART RATE: 73 BPM | DIASTOLIC BLOOD PRESSURE: 74 MMHG | SYSTOLIC BLOOD PRESSURE: 150 MMHG

## 2024-04-26 DIAGNOSIS — I10 PRIMARY HYPERTENSION: Primary | ICD-10-CM

## 2024-04-26 PROCEDURE — 1159F MED LIST DOCD IN RCRD: CPT | Performed by: NURSE PRACTITIONER

## 2024-04-26 PROCEDURE — 99214 OFFICE O/P EST MOD 30 MIN: CPT | Performed by: NURSE PRACTITIONER

## 2024-04-26 PROCEDURE — 3078F DIAST BP <80 MM HG: CPT | Performed by: NURSE PRACTITIONER

## 2024-04-26 PROCEDURE — 3077F SYST BP >= 140 MM HG: CPT | Performed by: NURSE PRACTITIONER

## 2024-04-26 PROCEDURE — 1160F RVW MEDS BY RX/DR IN RCRD: CPT | Performed by: NURSE PRACTITIONER

## 2024-04-26 RX ORDER — NITROGLYCERIN 0.4 MG/1
TABLET SUBLINGUAL
COMMUNITY

## 2024-04-26 RX ORDER — AMLODIPINE BESYLATE 5 MG/1
2.5 TABLET ORAL DAILY
Qty: 45 TABLET | Refills: 3 | Status: SHIPPED | OUTPATIENT
Start: 2024-04-26

## 2024-04-26 NOTE — PROGRESS NOTES
Subjective   Gerda Bolden is a 68 y.o. female. Presents today for   Chief Complaint   Patient presents with    Rapid Heart Rate     X's 2 weeks       History Of Present Illness  Gerda has had period of flushing where she feels dizzy, and her blood pressure is elevated.      Patient Active Problem List   Diagnosis    Abnormal CT scan of heart    Abnormal levels of other serum enzymes    Adhesive capsulitis of left shoulder    Anxiety    Arteriosclerosis of coronary artery    Benign essential hypertension    Bilateral hearing loss    Bradycardia    Chest pain due to myocardial ischemia    Chest pain    Ischemic chest pain    Chronic obstructive pulmonary disease    Nausea    Diabetes mellitus    Disequilibrium    Dyslipidemia    Ex-smoker    Fatigue    Hiatal hernia    Hyperlipidemia    Thyroid disease    Immunization due    Increased glucose level    Melanocytic nevus    Mixed anxiety and depressive disorder    High blood pressure    Atrial fibrillation    Postural hypotension    Breast cancer screening    Encounter for follow-up examination after completed treatment for conditions other than malignant neoplasm    Preop cardiovascular exam    Presence of coronary angioplasty implant and graft    Rectal hemorrhage    Shoulder pain    Skin lesion    Syncope    Thickened endometrium    Tobacco user    Unstable angina    Vitamin B12 deficiency    Vitamin D deficiency    Peña's esophagus    Dvrtclos of lg int w/o perforation or abscess w/o bleeding    Early satiety    Gastro-esophageal reflux disease with esophagitis    Hemochromatosis, unspecified    Long term (current) use of anticoagulants    Nonalcoholic steatohepatitis (BELLAMY)    Polyp of colon    Personal history of colonic polyps    Dysphagia    RUQ pain       Social History     Socioeconomic History    Marital status:    Tobacco Use    Smoking status: Former     Current packs/day: 0.00     Average packs/day: 1.5 packs/day for 46.0 years (69.0 ttl pk-yrs)  "    Types: Cigarettes     Start date: 1971     Quit date: 2017     Years since quittin.3    Smokeless tobacco: Never   Vaping Use    Vaping status: Never Used   Substance and Sexual Activity    Alcohol use: Never    Drug use: Never    Sexual activity: Defer       Allergies   Allergen Reactions    Ezetimibe Unknown - Low Severity    Statins Other (See Comments)     Muscle aches       Current Outpatient Medications on File Prior to Visit   Medication Sig Dispense Refill    Alirocumab (Praluent) 75 MG/ML solution auto-injector Inject 1 mL under the skin into the appropriate area as directed Every 14 (Fourteen) Days. 2.24 mL 2    aspirin 81 MG EC tablet Take 1 tablet by mouth Daily.      Calcium Carbonate Antacid (CALCIUM CARBONATE PO) tablet      Cholecalciferol (Vitamin D3) 50 MCG (2000 UT) capsule Take 1 capsule by mouth Daily. 90 capsule 0    dexlansoprazole (DEXILANT) 60 MG capsule Take 1 capsule by mouth Daily.      isosorbide mononitrate (IMDUR) 30 MG 24 hr tablet Take 1 tablet by mouth Daily.      levothyroxine (Synthroid) 100 MCG tablet Take 1 tablet by mouth Daily. 90 tablet 1    meclizine (ANTIVERT) 25 MG tablet TAKE 1 TABLET BY MOUTH THREE TIMES DAILY AS NEEDED FOR DIZZINESS 90 tablet 0    metFORMIN (GLUCOPHAGE) 500 MG tablet Take 1 tablet by mouth 2 (Two) Times a Day With Meals. 180 tablet 1    metoprolol succinate XL (Toprol XL) 25 MG 24 hr tablet Take 1 tablet by mouth Daily. 180 tablet 1    nitroglycerin (NITROSTAT) 0.4 MG SL tablet PLACE ONE TABLET UNDER THE TONGUE EVERY 5 MINUTES AS NEEDED FOR CHEST PAIN      Vitamin E (E-200) 90 MG (200 UNIT) capsule capsule      Vitamin E 180 MG (400 UNIT) capsule capsule Take 1 capsule by mouth Daily. 90 capsule 1     No current facility-administered medications on file prior to visit.       Objective   Vitals:    24 1015   BP: 150/74   Pulse: 73   SpO2: 95%   Weight: 73.8 kg (162 lb 9.6 oz)   Height: 165.1 cm (65\")     Body mass index is 27.06 " kg/m².    Physical Exam  Constitutional:       Appearance: She is obese.   HENT:      Head: Normocephalic and atraumatic.      Mouth/Throat:      Mouth: Mucous membranes are moist.   Eyes:      Pupils: Pupils are equal, round, and reactive to light.   Cardiovascular:      Rate and Rhythm: Normal rate and regular rhythm.      Pulses: Normal pulses.      Heart sounds: Normal heart sounds.   Pulmonary:      Effort: Pulmonary effort is normal.      Breath sounds: Normal breath sounds.   Abdominal:      General: Abdomen is flat.   Musculoskeletal:         General: Normal range of motion.   Skin:     General: Skin is warm and dry.      Capillary Refill: Capillary refill takes less than 2 seconds.   Neurological:      General: No focal deficit present.      Mental Status: She is alert.   Psychiatric:         Mood and Affect: Mood normal.     Procedures     Assessment & Plan   Diagnoses and all orders for this visit:    1. Primary hypertension (Primary)  -     amLODIPine (NORVASC) 5 MG tablet; Take 0.5 tablets by mouth Daily.  Dispense: 45 tablet; Refill: 3       Discussed Care Gaps, ordered referrals and encouraged vaccination updates.       - Pt agrees with plan of care and denies further questions/concerns today  - This document is intended for medical expert use only. Persons  reading this document without medical staff guidance may result in misinterpretation and unintended morbidity     Go to the ER for any possible life-threatening symptoms such as chest pain or shortness of air.      Please allow 3-5 business days for recommendations based on new results      I personally spent time with this patient, preparing for the visit, reviewing tests, obtaining and/or reviewing a separately obtained history, performing a medically appropriate examination and/or evaluation, counseling and educating the patient/family/caregiver, ordering medications,  documenting information in the medical record and indepentently interpreting  results.         BMI is >= 25 and <30. (Overweight) The following options were offered after discussion;: weight loss educational material (shared in after visit summary), exercise counseling/recommendations, and nutrition counseling/recommendations     Return in about 6 weeks (around 6/4/2024) for Next scheduled follow up with Dr. Lamas.

## 2024-04-29 ENCOUNTER — OFFICE VISIT (OUTPATIENT)
Dept: SLEEP MEDICINE | Facility: HOSPITAL | Age: 69
End: 2024-04-29
Payer: MEDICARE

## 2024-04-29 VITALS — WEIGHT: 161 LBS | OXYGEN SATURATION: 96 % | BODY MASS INDEX: 26.82 KG/M2 | HEART RATE: 94 BPM | HEIGHT: 65 IN

## 2024-04-29 DIAGNOSIS — I10 PRIMARY HYPERTENSION: ICD-10-CM

## 2024-04-29 DIAGNOSIS — G47.30 SLEEP APNEA, UNSPECIFIED TYPE: Primary | ICD-10-CM

## 2024-04-29 DIAGNOSIS — G47.9 SLEEP DISTURBANCES: ICD-10-CM

## 2024-04-29 PROCEDURE — G0463 HOSPITAL OUTPT CLINIC VISIT: HCPCS

## 2024-04-29 RX ORDER — ZOLPIDEM TARTRATE 5 MG/1
5 TABLET ORAL NIGHTLY PRN
Qty: 1 TABLET | Refills: 0 | Status: SHIPPED | OUTPATIENT
Start: 2024-04-29

## 2024-04-29 NOTE — PROGRESS NOTES
[facility]  Telephone: [phone] / Fax: [phone] [place]  Telephone: [phone] / Fax: [phone] Josiane Ryan    Referring Physician: Siddharth Lamas DO  PCP: Siddharth Lamas DO    Reason for consult:  sleep apnea    Gerda Bolden is a 68 y.o.female  was seen in the Sleep Disorders Center today for evaluation of sleep apnea.  She reports ongoing sleep disturbances, loud snoring. She denies choking/gasping for breath.  She denies morning headaches.  She reports both problems with falling asleep and staying asleep. She has history of CAD/stent in 2016.  No prior history of stroke/CHF.  She has intermittent heart palpitations. Her bedtime schedule is MN-6am.    SH- 1 ppd, former smoker age [age], 2 sodas per day.    ROS- +SOA, +dizziness, +GERD, +heat intolerance, rest is negative.    Gerda Bolden  has a past medical history of Allergic (Statins), Diabetes mellitus, Diverticulosis, GERD (gastroesophageal reflux disease), HL (hearing loss), Hyperlipidemia, Hypertension, and Hypothyroidism.    Current Medications:    Current Outpatient Medications:     Alirocumab (Praluent) 75 MG/ML solution auto-injector, Inject 1 mL under the skin into the appropriate area as directed Every 14 (Fourteen) Days., Disp: 2.24 mL, Rfl: 2    amLODIPine (NORVASC) 5 MG tablet, Take 0.5 tablets by mouth Daily., Disp: 45 tablet, Rfl: 3    aspirin 81 MG EC tablet, Take 1 tablet by mouth Daily., Disp: , Rfl:     Calcium Carbonate Antacid (CALCIUM CARBONATE PO), tablet, Disp: , Rfl:     Cholecalciferol (Vitamin D3) 50 MCG (2000 UT) capsule, Take 1 capsule by mouth Daily., Disp: 90 capsule, Rfl: 0    dexlansoprazole (DEXILANT) 60 MG capsule, Take 1 capsule by mouth Daily., Disp: , Rfl:     isosorbide mononitrate (IMDUR) 30 MG 24 hr tablet, Take 1 tablet by mouth Daily., Disp: , Rfl:     levothyroxine (Synthroid) 100 MCG tablet, Take 1 tablet by mouth Daily., Disp: 90 tablet, Rfl: 1    meclizine (ANTIVERT)  "25 MG tablet, TAKE 1 TABLET BY MOUTH THREE TIMES DAILY AS NEEDED FOR DIZZINESS, Disp: 90 tablet, Rfl: 0    metFORMIN (GLUCOPHAGE) 500 MG tablet, Take 1 tablet by mouth 2 (Two) Times a Day With Meals., Disp: 180 tablet, Rfl: 1    metoprolol succinate XL (Toprol XL) 25 MG 24 hr tablet, Take 1 tablet by mouth Daily., Disp: 180 tablet, Rfl: 1    nitroglycerin (NITROSTAT) 0.4 MG SL tablet, PLACE ONE TABLET UNDER THE TONGUE EVERY 5 MINUTES AS NEEDED FOR CHEST PAIN, Disp: , Rfl:     Vitamin E (E-200) 90 MG (200 UNIT) capsule, capsule, Disp: , Rfl:     Vitamin E 180 MG (400 UNIT) capsule capsule, Take 1 capsule by mouth Daily., Disp: 90 capsule, Rfl: 1    I have reviewed Past Medical History, Past Surgical History, Medication List, Social History and Family History as entered in Sleep Questionnaire and EPIC.    ESS  12   Vital Signs Pulse 94   Ht 165.1 cm (65\")   Wt 73 kg (161 lb)   SpO2 96%   BMI 26.79 kg/m²  Body mass index is 26.79 kg/m².    General Alert and oriented. No acute distress noted   Pharynx/Throat Class IV  Mallampati airway, large tongue, no evidence of redundant lateral pharyngeal tissue. No oral lesions. No thrush. Moist mucous membranes.   Head Normocephalic. Symmetrical. Atraumatic.    Nose No septal deviation. No drainage   Chest Wall Normal shape. Symmetric expansion with respiration. No tenderness.   Neck Trachea midline, no thyromegaly or adenopathy    Lungs Clear to auscultation bilaterally. No wheezes. No rhonchi. No rales. Respirations regular, even and unlabored.   Heart Regular rhythm and normal rate. Normal S1 and S2. No murmur   Abdomen Soft, non-tender and non-distended. Normal bowel sounds. No masses.   Extremities Moves all extremities well. No edema   Psychiatric Normal mood and affect.        Impression:  1. Sleep apnea, unspecified type    2. Sleep disturbances    3. Primary hypertension          Plan:  I discussed the pathophysiology of obstructive sleep apnea with the patient.  We " discussed the adverse outcomes associated with untreated sleep-disordered breathing.  We discussed treatment modalities of obstructive sleep apnea including CPAP device.         I appreciate the opportunity to participate in this patient's care.      HARLEEN Baca  [facility]  Phone: [phone]      Part of this note may be an electronic transcription/translation of spoken language to printed text using the Dragon Dictation System. Some errors may exist even though the document was edited.

## 2024-05-13 DIAGNOSIS — E78.2 MIXED HYPERLIPIDEMIA: ICD-10-CM

## 2024-05-13 RX ORDER — ALIROCUMAB 75 MG/ML
75 INJECTION, SOLUTION SUBCUTANEOUS
Qty: 2.24 ML | Refills: 2 | Status: SHIPPED | OUTPATIENT
Start: 2024-05-13

## 2024-05-16 ENCOUNTER — HOSPITAL ENCOUNTER (OUTPATIENT)
Dept: SLEEP MEDICINE | Facility: HOSPITAL | Age: 69
End: 2024-05-16
Payer: MEDICARE

## 2024-05-16 DIAGNOSIS — G47.9 SLEEP DISTURBANCES: ICD-10-CM

## 2024-05-16 DIAGNOSIS — G47.30 SLEEP APNEA, UNSPECIFIED TYPE: ICD-10-CM

## 2024-05-16 PROCEDURE — 95810 POLYSOM 6/> YRS 4/> PARAM: CPT

## 2024-05-17 VITALS — BODY MASS INDEX: 26.81 KG/M2 | HEIGHT: 65 IN | WEIGHT: 160.94 LBS

## 2024-05-24 ENCOUNTER — TELEPHONE (OUTPATIENT)
Dept: SLEEP MEDICINE | Facility: HOSPITAL | Age: 69
End: 2024-05-24
Payer: MEDICARE

## 2024-05-24 NOTE — TELEPHONE ENCOUNTER
.Left vm requesting patient call Sleep disorder center to schedule a follow up to discuss sleep study results and treatment options

## 2024-05-29 DIAGNOSIS — E03.2 HYPOTHYROIDISM DUE TO MEDICATION: ICD-10-CM

## 2024-05-29 DIAGNOSIS — E11.9 DIABETES MELLITUS WITHOUT COMPLICATION: ICD-10-CM

## 2024-05-29 RX ORDER — LEVOTHYROXINE SODIUM 0.1 MG/1
100 TABLET ORAL DAILY
Qty: 90 TABLET | Refills: 1 | Status: SHIPPED | OUTPATIENT
Start: 2024-05-29 | End: 2024-05-31

## 2024-05-31 DIAGNOSIS — E03.2 HYPOTHYROIDISM DUE TO MEDICATION: Primary | ICD-10-CM

## 2024-05-31 DIAGNOSIS — E03.9 HYPOTHYROIDISM, UNSPECIFIED TYPE: ICD-10-CM

## 2024-05-31 RX ORDER — LEVOTHYROXINE SODIUM 88 UG/1
88 TABLET ORAL DAILY
Qty: 30 TABLET | Refills: 2 | Status: SHIPPED | OUTPATIENT
Start: 2024-05-31

## 2024-06-04 ENCOUNTER — OFFICE VISIT (OUTPATIENT)
Dept: FAMILY MEDICINE CLINIC | Facility: CLINIC | Age: 69
End: 2024-06-04
Payer: MEDICARE

## 2024-06-04 VITALS
OXYGEN SATURATION: 98 % | BODY MASS INDEX: 26.66 KG/M2 | DIASTOLIC BLOOD PRESSURE: 66 MMHG | SYSTOLIC BLOOD PRESSURE: 124 MMHG | WEIGHT: 160 LBS | HEART RATE: 73 BPM | HEIGHT: 65 IN

## 2024-06-04 DIAGNOSIS — E53.8 VITAMIN B12 DEFICIENCY: ICD-10-CM

## 2024-06-04 DIAGNOSIS — E11.9 ENCOUNTER FOR DIABETIC FOOT EXAM: ICD-10-CM

## 2024-06-04 DIAGNOSIS — R00.1 BRADYCARDIA: ICD-10-CM

## 2024-06-04 DIAGNOSIS — E78.2 MIXED HYPERLIPIDEMIA: ICD-10-CM

## 2024-06-04 DIAGNOSIS — R42 VERTIGO: ICD-10-CM

## 2024-06-04 DIAGNOSIS — Z00.00 MEDICARE ANNUAL WELLNESS VISIT, SUBSEQUENT: Primary | ICD-10-CM

## 2024-06-04 DIAGNOSIS — I10 BENIGN ESSENTIAL HYPERTENSION: ICD-10-CM

## 2024-06-04 DIAGNOSIS — Z12.31 VISIT FOR SCREENING MAMMOGRAM: ICD-10-CM

## 2024-06-04 DIAGNOSIS — E66.3 OVERWEIGHT (BMI 25.0-29.9): ICD-10-CM

## 2024-06-04 DIAGNOSIS — E11.9 TYPE 2 DIABETES MELLITUS WITHOUT COMPLICATION, WITHOUT LONG-TERM CURRENT USE OF INSULIN: ICD-10-CM

## 2024-06-04 DIAGNOSIS — E55.9 VITAMIN D DEFICIENCY: ICD-10-CM

## 2024-06-04 DIAGNOSIS — I95.1 ORTHOSTATIC HYPOTENSION: ICD-10-CM

## 2024-06-04 DIAGNOSIS — I25.10 ARTERIOSCLEROSIS OF CORONARY ARTERY: ICD-10-CM

## 2024-06-04 RX ORDER — ALIROCUMAB 75 MG/ML
75 INJECTION, SOLUTION SUBCUTANEOUS
Qty: 6 ML | Refills: 3 | Status: SHIPPED | OUTPATIENT
Start: 2024-06-04

## 2024-06-04 RX ORDER — METFORMIN HYDROCHLORIDE 500 MG/1
500 TABLET, EXTENDED RELEASE ORAL
Qty: 90 TABLET | Refills: 3 | Status: SHIPPED | OUTPATIENT
Start: 2024-06-04

## 2024-06-04 NOTE — PROGRESS NOTES
QUICK REFERENCE INFORMATION:  The ABCs of the Annual Wellness Visit    Subsequent Medicare Wellness Visit    HEALTH RISK ASSESSMENT    1955  Gerda Bolden is a 68 y.o. female who presents for an Subsequent Wellness Visit.    The following portions of the patient's history were reviewed and   updated as appropriate: allergies, current medications, past family history, past medical history, past social history, past surgical history, and problem list.    Compared to one year ago, the patient feels his physical   health is the same.    Compared to one year ago, the patient feels his mental   health is the same.    Recent Hospitalizations:  She was not admitted to the hospital during the last year.     Current Medical Providers:  Patient Care Team:  Siddharth Lamas DO as PCP - General (Family Medicine)  Kayden Vazquez DPM as Consulting Physician (Podiatry)  Benjamín Sarkar MD (Dermatology)    I reviewed list of current Medical providers and suppliers with patient and are listed above to the best of the patient's and my knowledge.    Smoking Status:  Social History     Tobacco Use   Smoking Status Former    Current packs/day: 0.00    Average packs/day: 1.5 packs/day for 46.0 years (69.0 ttl pk-yrs)    Types: Cigarettes    Start date: 1971    Quit date: 2017    Years since quittin.4   Smokeless Tobacco Never       Alcohol Consumption:  Social History     Substance and Sexual Activity   Alcohol Use Never       Depression Screen:       2024     1:00 PM   PHQ-2/PHQ-9 Depression Screening   Little Interest or Pleasure in Doing Things 0-->not at all   Feeling Down, Depressed or Hopeless 0-->not at all   PHQ-9: Brief Depression Severity Measure Score 0       Health Habits and Functional and Cognitive Screenin/4/2024     1:00 PM   Functional & Cognitive Status   Do you have difficulty preparing food and eating? No   Do you have difficulty bathing yourself, getting dressed or grooming yourself?  No   Do you have difficulty using the toilet? No   Do you have difficulty moving around from place to place? No   Do you have trouble with steps or getting out of a bed or a chair? No   Current Diet Well Balanced Diet   Dental Exam Not up to date   Eye Exam Not up to date   Exercise (times per week) 2 times per week   Current Exercises Include House Cleaning   Do you need help using the phone?  No   Are you deaf or do you have serious difficulty hearing?  Yes   Do you need help to go to places out of walking distance? No   Do you need help shopping? No   Do you need help preparing meals?  No   Do you need help with housework?  No   Do you need help with laundry? No   Do you need help taking your medications? No   Do you need help managing money? No   Do you ever drive or ride in a car without wearing a seat belt? No   Have you felt unusual stress, anger or loneliness in the last month? No   Who do you live with? Spouse   If you need help, do you have trouble finding someone available to you? No   Have you been bothered in the last four weeks by sexual problems? No   Do you have difficulty concentrating, remembering or making decisions? No       Does the patient have evidence of cognitive impairment? No    Aspirin use counseling: Taking ASA appropriately as indicated    Recent Lab Results:  CMP:  Lab Results   Component Value Date     (H) 02/23/2021    BUN 7 (L) 06/04/2024    CREATININE 0.84 06/04/2024    EGFRIFNONA 67 01/03/2022    EGFRIFAFRI 77 01/03/2022    BCR 8 (L) 06/04/2024     06/04/2024    K 4.8 06/04/2024    CO2 26 06/04/2024    CALCIUM 10.3 06/04/2024    PROTENTOTREF 6.8 06/04/2024    ALBUMIN 4.4 06/04/2024    LABGLOBREF 2.4 06/04/2024    LABIL2 1.8 06/04/2024    BILITOT 0.4 06/04/2024    ALKPHOS 102 06/04/2024    AST 58 (H) 06/04/2024    ALT 63 (H) 06/04/2024     Lipid Panel:  Lab Results   Component Value Date    TRIG 165 (H) 06/04/2024    HDL 59 06/04/2024    VLDL 28 06/04/2024      HbA1c:  Lab Results   Component Value Date    HGBA1C 6.9 (H) 06/04/2024       Visual Acuity:  No results found.    Age-appropriate Screening Schedule:  Refer to the list below for future screening recommendations based on patient's age, sex and/or medical conditions. Orders for these recommended tests are listed in the plan section. The patient has been provided with a written plan.    Health Maintenance   Topic Date Due    TDAP/TD VACCINES (1 - Tdap) Never done    ZOSTER VACCINE (1 of 2) Never done    Hepatitis B (1 of 3 - Risk 3-dose series) Never done    RSV Vaccine - Adults (1 - 1-dose 60+ series) Never done    DIABETIC EYE EXAM  06/10/2023    COVID-19 Vaccine (3 - 2023-24 season) 09/01/2023    LUNG CANCER SCREENING  01/11/2024    DXA SCAN  07/11/2024    INFLUENZA VACCINE  08/01/2024    HEMOGLOBIN A1C  12/04/2024    ANNUAL WELLNESS VISIT  06/04/2025    DIABETIC FOOT EXAM  06/04/2025    LIPID PANEL  06/04/2025    URINE MICROALBUMIN  06/04/2025    BMI FOLLOWUP  06/04/2025    MAMMOGRAM  01/03/2026    COLORECTAL CANCER SCREENING  04/26/2027    HEPATITIS C SCREENING  Completed    Pneumococcal Vaccine 65+  Completed          Outpatient Medications Prior to Visit   Medication Sig Dispense Refill    amLODIPine (NORVASC) 5 MG tablet Take 0.5 tablets by mouth Daily. 45 tablet 3    aspirin 81 MG EC tablet Take 1 tablet by mouth Daily.      Calcium Carbonate Antacid (CALCIUM CARBONATE PO) tablet      Cholecalciferol (Vitamin D3) 50 MCG (2000 UT) capsule Take 1 capsule by mouth Daily. 90 capsule 0    dexlansoprazole (DEXILANT) 60 MG capsule Take 1 capsule by mouth Daily.      isosorbide mononitrate (IMDUR) 30 MG 24 hr tablet Take 1 tablet by mouth Daily.      meclizine (ANTIVERT) 25 MG tablet TAKE 1 TABLET BY MOUTH THREE TIMES DAILY AS NEEDED FOR DIZZINESS 90 tablet 0    Vitamin E (E-200) 90 MG (200 UNIT) capsule capsule      Vitamin E 180 MG (400 UNIT) capsule capsule Take 1 capsule by mouth Daily. 90 capsule 1     zolpidem (Ambien) 5 MG tablet Take 1 tablet by mouth At Night As Needed for Sleep (Bring to the sleep lab, do not take at home). 1 tablet 0    Alirocumab (Praluent) 75 MG/ML solution auto-injector Inject 1 mL under the skin into the appropriate area as directed Every 14 (Fourteen) Days. 2.24 mL 2    levothyroxine (Synthroid) 88 MCG tablet Take 1 tablet by mouth Daily. 30 tablet 2    metFORMIN (GLUCOPHAGE) 500 MG tablet Take 1 tablet by mouth 2 (Two) Times a Day With Meals. 180 tablet 1    nitroglycerin (NITROSTAT) 0.4 MG SL tablet PLACE ONE TABLET UNDER THE TONGUE EVERY 5 MINUTES AS NEEDED FOR CHEST PAIN (Patient not taking: Reported on 6/4/2024)      metoprolol succinate XL (Toprol XL) 25 MG 24 hr tablet Take 1 tablet by mouth Daily. (Patient not taking: Reported on 6/4/2024) 180 tablet 1     No facility-administered medications prior to visit.       Patient Active Problem List   Diagnosis    Abnormal CT scan of heart    Abnormal levels of other serum enzymes    Adhesive capsulitis of left shoulder    Anxiety    Arteriosclerosis of coronary artery    Benign essential hypertension    Bilateral hearing loss    Bradycardia    Chest pain due to myocardial ischemia    Chest pain    Ischemic chest pain    Chronic obstructive pulmonary disease    Nausea    Diabetes mellitus    Disequilibrium    Dyslipidemia    Ex-smoker    Fatigue    Hiatal hernia    Hyperlipidemia    Thyroid disease    Immunization due    Increased glucose level    Melanocytic nevus    Mixed anxiety and depressive disorder    High blood pressure    Atrial fibrillation    Postural hypotension    Breast cancer screening    Encounter for follow-up examination after completed treatment for conditions other than malignant neoplasm    Preop cardiovascular exam    Presence of coronary angioplasty implant and graft    Rectal hemorrhage    Shoulder pain    Skin lesion    Syncope    Thickened endometrium    Tobacco user    Unstable angina    Vitamin B12 deficiency     Vitamin D deficiency    Peña's esophagus    Dvrtclos of lg int w/o perforation or abscess w/o bleeding    Early satiety    Gastro-esophageal reflux disease with esophagitis    Hemochromatosis, unspecified    Long term (current) use of anticoagulants    Nonalcoholic steatohepatitis (BELLAMY)    Polyp of colon    Personal history of colonic polyps    Dysphagia    RUQ pain       Advance Care Planning:  ACP discussion was held with the patient during this visit. Patient does not have an advance directive, information provided.    Identification of Risk Factors:  Risk factors include: Cardiovascular risk  Obesity/Overweight .    Review of Systems   HENT:  Negative for ear pain and hearing loss.    Respiratory:  Negative for chest tightness and shortness of breath.    Cardiovascular:  Negative for chest pain.   Gastrointestinal:  Negative for nausea and vomiting.   Neurological:  Positive for dizziness.       Compared to one year ago, the patient feels her physical health is the same.  Compared to one year ago, the patient feels her mental health is the same.    Objective     Physical Exam  Vitals and nursing note reviewed.   Constitutional:       Appearance: She is well-developed.   HENT:      Head: Normocephalic and atraumatic.      Right Ear: Tympanic membrane normal.      Left Ear: Tympanic membrane normal.   Neck:      Thyroid: No thyromegaly.      Vascular: No JVD.   Cardiovascular:      Rate and Rhythm: Normal rate and regular rhythm.      Heart sounds: Normal heart sounds. No murmur heard.     No friction rub. No gallop.   Pulmonary:      Effort: Pulmonary effort is normal. No respiratory distress.      Breath sounds: Normal breath sounds. No wheezing or rales.   Abdominal:      General: Bowel sounds are normal. There is no distension.      Palpations: Abdomen is soft.      Tenderness: There is no abdominal tenderness. There is no guarding or rebound.   Musculoskeletal:      Cervical back: Neck supple.  "  Skin:     General: Skin is warm and dry.   Neurological:      Mental Status: She is alert.   Psychiatric:         Behavior: Behavior normal.         Vitals:    06/04/24 1311   BP: 124/66   Pulse: 73   SpO2: 98%   Weight: 72.6 kg (160 lb)   Height: 165.1 cm (65\")   PainSc: 0-No pain     Body mass index is 26.63 kg/m².           Assessment & Plan   Patient Self-Management and Personalized Health Advice  The patient has been provided with information about: diet and exercise and preventive services including:   Annual Wellness Visit (AWV).    Visit Diagnoses:    ICD-10-CM ICD-9-CM   1. Medicare annual wellness visit, subsequent  Z00.00 V70.0   2. Type 2 diabetes mellitus without complication, without long-term current use of insulin  E11.9 250.00   3. Bradycardia  R00.1 427.89   4. Vertigo  R42 780.4   5. Mixed hyperlipidemia  E78.2 272.2   6. Vitamin D deficiency  E55.9 268.9   7. Vitamin B12 deficiency  E53.8 266.2   8. Encounter for diabetic foot exam  E11.9 250.00   9. Visit for screening mammogram  Z12.31 V76.12   10. Orthostatic hypotension  I95.1 458.0   11. Arteriosclerosis of coronary artery  I25.10 414.00   12. Benign essential hypertension  I10 401.1   13. Overweight (BMI 25.0-29.9)  E66.3 278.02       Orders Placed This Encounter   Procedures    Mammo Screening Digital Tomosynthesis Bilateral With CAD     Standing Status:   Future     Standing Expiration Date:   6/4/2025     Scheduling Instructions:      [facility] in [place], used to be dxp     Order Specific Question:   Reason for Exam:     Answer:   breast cancer screening     Order Specific Question:   Release to patient     Answer:   Routine Release [[id number]]    Microalbumin / Creatinine Urine Ratio - Urine, Clean Catch     Order Specific Question:   Release to patient     Answer:   Routine Release [[id number]]     Order Specific Question:   [facility] Has the patient fasted?     Answer:   Yes    Lipid Panel     Order Specific Question:   Release to " patient     Answer:   Routine Release [[id number]]     Order Specific Question:   [facility] Has the patient fasted?     Answer:   Yes    Comprehensive Metabolic Panel     Order Specific Question:   Release to patient     Answer:   Routine Release [[id number]]     Order Specific Question:   [facility] Has the patient fasted?     Answer:   Yes    Hemoglobin A1c     Order Specific Question:   Release to patient     Answer:   Routine Release [1400000002]     Order Specific Question:   [facility] Has the patient fasted?     Answer:   Yes    TSH     Order Specific Question:   Release to patient     Answer:   Routine Release [[id number]]     Order Specific Question:   [facility] Has the patient fasted?     Answer:   Yes    CBC (No Diff)     Order Specific Question:   Release to patient     Answer:   Routine Release [[id number]]     Order Specific Question:   [facility] Has the patient fasted?     Answer:   Yes    Vitamin D,25-Hydroxy     Order Specific Question:   Release to patient     Answer:   Routine Release [[id number]]     Order Specific Question:   [facility] Has the patient fasted?     Answer:   Yes    Ambulatory Referral to ENT (Otolaryngology)     Referral Priority:   Routine     Referral Type:   Consultation     Referral Reason:   Specialty Services Required     Referred to Provider:   Tejas Galarza MD     Requested Specialty:   Otolaryngology     Number of Visits Requested:   1    Ambulatory Referral to Physical Therapy     Referral Priority:   Routine     Referral Type:   Physical Therapy     Referral Reason:   Specialty Services Required     Requested Specialty:   Physical Therapy     Number of Visits Requested:   1       Outpatient Encounter Medications as of 6/4/2024   Medication Sig Dispense Refill    Alirocumab (Praluent) 75 MG/ML solution auto-injector Inject 1 mL under the skin into the appropriate area as directed Every 14 (Fourteen) Days. 6 mL 3    amLODIPine (NORVASC) 5 MG tablet Take 0.5 tablets by mouth Daily.  45 tablet 3    aspirin 81 MG EC tablet Take 1 tablet by mouth Daily.      Calcium Carbonate Antacid (CALCIUM CARBONATE PO) tablet      Cholecalciferol (Vitamin D3) 50 MCG (2000 UT) capsule Take 1 capsule by mouth Daily. 90 capsule 0    dexlansoprazole (DEXILANT) 60 MG capsule Take 1 capsule by mouth Daily.      isosorbide mononitrate (IMDUR) 30 MG 24 hr tablet Take 1 tablet by mouth Daily.      meclizine (ANTIVERT) 25 MG tablet TAKE 1 TABLET BY MOUTH THREE TIMES DAILY AS NEEDED FOR DIZZINESS 90 tablet 0    Vitamin E (E-200) 90 MG (200 UNIT) capsule capsule      Vitamin E 180 MG (400 UNIT) capsule capsule Take 1 capsule by mouth Daily. 90 capsule 1    zolpidem (Ambien) 5 MG tablet Take 1 tablet by mouth At Night As Needed for Sleep (Bring to the sleep lab, do not take at home). 1 tablet 0    [DISCONTINUED] Alirocumab (Praluent) 75 MG/ML solution auto-injector Inject 1 mL under the skin into the appropriate area as directed Every 14 (Fourteen) Days. 2.24 mL 2    [DISCONTINUED] levothyroxine (Synthroid) 88 MCG tablet Take 1 tablet by mouth Daily. 30 tablet 2    [DISCONTINUED] metFORMIN (GLUCOPHAGE) 500 MG tablet Take 1 tablet by mouth 2 (Two) Times a Day With Meals. 180 tablet 1    metFORMIN ER (GLUCOPHAGE-XR) 500 MG 24 hr tablet Take 1 tablet by mouth Daily With Breakfast. 90 tablet 3    nitroglycerin (NITROSTAT) 0.4 MG SL tablet PLACE ONE TABLET UNDER THE TONGUE EVERY 5 MINUTES AS NEEDED FOR CHEST PAIN (Patient not taking: Reported on 6/4/2024)      [DISCONTINUED] metoprolol succinate XL (Toprol XL) 25 MG 24 hr tablet Take 1 tablet by mouth Daily. (Patient not taking: Reported on 6/4/2024) 180 tablet 1     No facility-administered encounter medications on file as of 6/4/2024.       Reviewed use of high risk medication in the elderly: yes  Reviewed for potential of harmful drug interactions in the elderly: yes  No opioid medication identified on active medication list. I have reviewed chart for other potential  high  risk medication/s and harmful drug interactions in the elderly.    Social History     Socioeconomic History    Marital status:    Tobacco Use    Smoking status: Former     Current packs/day: 0.00     Average packs/day: 1.5 packs/day for 46.0 years (69.0 ttl pk-yrs)     Types: Cigarettes     Start date: 1971     Quit date: 2017     Years since quittin.4    Smokeless tobacco: Never   Vaping Use    Vaping status: Never Used   Substance and Sexual Activity    Alcohol use: Never    Drug use: Never    Sexual activity: Defer     Patient was screened for OUD and TIMOTHY risk factors.  Gerda Bolden's pain level was assessed as well today.  I included a review current recommendations on pain management, best use practices, current CDC guidelines, alternatives to opioids including OTC & Rx topicals, non-opioid oral medications (eg nsaids, acetominophen) and life style interventions such as yoga, felicitas chi, stretching, regular exercise, PT/OT and referral to specialty care like Pain Management that specialize in pain treatment when appropriate.   I also included a review of serious risks of opioid use and substance use disorder.  I have included all of this in the after visit summary along with other risk factors identified that are pertinent to the patient today.      Follow Up:  Return in about 3 months (around 2024), or if symptoms worsen or fail to improve.     An After Visit Summary and PPPS with all of these plans were given to the patient.           ++++++++++++++++++++++++++++++++++++++++++++++++++++++++++++++++++   Additional E&M Note during same encounter follows:  Patient has multiple medical problems which are significant and separately identifiable that require additional work above and beyond the Medicare Wellness Visit.   Chief Complaint   Patient presents with    Medicare Wellness-subsequent    Hypertension    Hyperlipidemia    Diabetes    Coronary Artery Disease    Dizziness     Hypertension  This  is a chronic problem. The problem is unchanged. The problem is controlled. Pertinent negatives include no chest pain or shortness of breath. The current treatment provides moderate improvement.   Hyperlipidemia  This is a chronic problem. The problem is controlled. Recent lipid tests were reviewed and are normal. Pertinent negatives include no chest pain or shortness of breath. Treatments tried: praluent.   Diabetes  She presents for her follow-up diabetic visit. She has type 2 diabetes mellitus. Hypoglycemia symptoms include dizziness. Pertinent negatives for diabetes include no chest pain.   Coronary Artery Disease  Presents for follow-up visit. Symptoms include dizziness. Pertinent negatives include no chest pain, chest pressure, chest tightness or shortness of breath. Risk factors include hyperlipidemia. The symptoms have been stable.   Dizziness  This is a recurrent problem. The problem has been waxing and waning. Associated symptoms include urinary symptoms. Pertinent negatives include no chest pain, nausea or vomiting. Associated symptoms comments: She has been having signifciant bradycardia and light headed with position changes, saw AP for cardiology yesterday and directed to stop metoprolol.  She held one dose so far.   Though occly has orthostatic symtoms;  She also has vertigo symptoms with quick head movements that feles separate from the orthostatic symptoms. .       Review of Systems   HENT:  Negative for ear pain and hearing loss.    Cardiovascular:  Negative for chest pain.   Respiratory:  Negative for chest tightness and shortness of breath.    Gastrointestinal:  Negative for nausea and vomiting.   Neurological:  Positive for dizziness.       Social History     Tobacco Use    Smoking status: Former     Current packs/day: 0.00     Average packs/day: 1.5 packs/day for 46.0 years (69.0 ttl pk-yrs)     Types: Cigarettes     Start date: 1971     Quit date: 2017     Years since quittin.4     "Smokeless tobacco: Never   Substance Use Topics    Alcohol use: Never     O:   Vitals:    06/04/24 1311   BP: 124/66   Pulse: 73   SpO2: 98%   Weight: 72.6 kg (160 lb)   Height: 165.1 cm (65\")   PainSc: 0-No pain     Body mass index is 26.63 kg/m².  Vitals and nursing note reviewed.   Constitutional:       Appearance: Well-developed.   HENT:      Head: Normocephalic and atraumatic.      Right Ear: Tympanic membrane normal.      Left Ear: Tympanic membrane normal.   Neck:      Thyroid: No thyromegaly.      Vascular: No JVD.   Pulmonary:      Effort: Pulmonary effort is normal. No respiratory distress.      Breath sounds: Normal breath sounds. No wheezing. No rales.   Cardiovascular:      Normal rate. Regular rhythm. Normal heart sounds.      No gallop.  No friction rub.   Abdominal:      General: Bowel sounds are normal. There is no distension.      Palpations: Abdomen is soft.      Tenderness: There is no abdominal tenderness. There is no guarding or rebound.   Musculoskeletal:      Cervical back: Neck supple. Skin:     General: Skin is warm and dry.   Neurological:      Mental Status: Alert.   Psychiatric:         Behavior: Behavior normal.         Diagnoses and all orders for this visit:    1. Medicare annual wellness visit, subsequent (Primary)    2. Type 2 diabetes mellitus without complication, without long-term current use of insulin  -     metFORMIN ER (GLUCOPHAGE-XR) 500 MG 24 hr tablet; Take 1 tablet by mouth Daily With Breakfast.  Dispense: 90 tablet; Refill: 3  -     Microalbumin / Creatinine Urine Ratio - Urine, Clean Catch  -     Lipid Panel  -     Comprehensive Metabolic Panel  -     Hemoglobin A1c  -     TSH    3. Bradycardia    4. Vertigo  -     Ambulatory Referral to ENT (Otolaryngology)  -     Ambulatory Referral to Physical Therapy  -     Comprehensive Metabolic Panel  -     CBC (No Diff)    5. Mixed hyperlipidemia  -     Alirocumab (Praluent) 75 MG/ML solution auto-injector; Inject 1 mL under the " skin into the appropriate area as directed Every 14 (Fourteen) Days.  Dispense: 6 mL; Refill: 3    6. Vitamin D deficiency  -     Vitamin D,25-Hydroxy    7. Vitamin B12 deficiency  -     CBC (No Diff)    8. Encounter for diabetic foot exam    9. Visit for screening mammogram  -     Mammo Screening Digital Tomosynthesis Bilateral With CAD; Future    10. Orthostatic hypotension    11. Arteriosclerosis of coronary artery  -     Alirocumab (Praluent) 75 MG/ML solution auto-injector; Inject 1 mL under the skin into the appropriate area as directed Every 14 (Fourteen) Days.  Dispense: 6 mL; Refill: 3    12. Benign essential hypertension    13. Overweight (BMI 25.0-29.9)    -dm2 - continue medications, recheck labs  -refer to PT for vestibular exercises and send ENT for further testing;  though has symptoms sometims sounds liek orthostasis, has also at times description of vertigo;  ? BPV.  -HLD - continue statin, recheck lipids.  -cad RF reduction, Lipid, BP and BS control.  -hypertension - controlled, continue medications  -due check blood counts and vitmain D;  due mammo      Return in about 3 months (around 9/4/2024), or if symptoms worsen or fail to improve.

## 2024-06-05 LAB
25(OH)D3+25(OH)D2 SERPL-MCNC: 92.6 NG/ML (ref 30–100)
ALBUMIN SERPL-MCNC: 4.4 G/DL (ref 3.9–4.9)
ALBUMIN/CREAT UR: 5 MG/G CREAT (ref 0–29)
ALBUMIN/GLOB SERPL: 1.8 {RATIO} (ref 1.2–2.2)
ALP SERPL-CCNC: 102 IU/L (ref 44–121)
ALT SERPL-CCNC: 63 IU/L (ref 0–32)
AST SERPL-CCNC: 58 IU/L (ref 0–40)
BILIRUB SERPL-MCNC: 0.4 MG/DL (ref 0–1.2)
BUN SERPL-MCNC: 7 MG/DL (ref 8–27)
BUN/CREAT SERPL: 8 (ref 12–28)
CALCIUM SERPL-MCNC: 10.3 MG/DL (ref 8.7–10.3)
CHLORIDE SERPL-SCNC: 101 MMOL/L (ref 96–106)
CHOLEST SERPL-MCNC: 147 MG/DL (ref 100–199)
CO2 SERPL-SCNC: 26 MMOL/L (ref 20–29)
CREAT SERPL-MCNC: 0.84 MG/DL (ref 0.57–1)
CREAT UR-MCNC: 75.3 MG/DL
EGFRCR SERPLBLD CKD-EPI 2021: 76 ML/MIN/1.73
ERYTHROCYTE [DISTWIDTH] IN BLOOD BY AUTOMATED COUNT: 13.1 % (ref 11.7–15.4)
GLOBULIN SER CALC-MCNC: 2.4 G/DL (ref 1.5–4.5)
GLUCOSE SERPL-MCNC: 140 MG/DL (ref 70–99)
HBA1C MFR BLD: 6.9 % (ref 4.8–5.6)
HCT VFR BLD AUTO: 41.3 % (ref 34–46.6)
HDLC SERPL-MCNC: 59 MG/DL
HGB BLD-MCNC: 14.1 G/DL (ref 11.1–15.9)
LDLC SERPL CALC-MCNC: 60 MG/DL (ref 0–99)
MCH RBC QN AUTO: 29.9 PG (ref 26.6–33)
MCHC RBC AUTO-ENTMCNC: 34.1 G/DL (ref 31.5–35.7)
MCV RBC AUTO: 88 FL (ref 79–97)
MICROALBUMIN UR-MCNC: 3.4 UG/ML
PLATELET # BLD AUTO: 206 X10E3/UL (ref 150–450)
POTASSIUM SERPL-SCNC: 4.8 MMOL/L (ref 3.5–5.2)
PROT SERPL-MCNC: 6.8 G/DL (ref 6–8.5)
RBC # BLD AUTO: 4.72 X10E6/UL (ref 3.77–5.28)
SODIUM SERPL-SCNC: 141 MMOL/L (ref 134–144)
TRIGL SERPL-MCNC: 165 MG/DL (ref 0–149)
TSH SERPL DL<=0.005 MIU/L-ACNC: 0.01 UIU/ML (ref 0.45–4.5)
VLDLC SERPL CALC-MCNC: 28 MG/DL (ref 5–40)
WBC # BLD AUTO: 6.1 X10E3/UL (ref 3.4–10.8)

## 2024-06-09 DIAGNOSIS — R74.8 ELEVATED LIVER ENZYMES: Primary | ICD-10-CM

## 2024-06-09 DIAGNOSIS — Z11.59 ENCOUNTER FOR SCREENING FOR OTHER VIRAL DISEASES: ICD-10-CM

## 2024-06-09 DIAGNOSIS — K76.0 HEPATIC STEATOSIS: ICD-10-CM

## 2024-06-09 DIAGNOSIS — E03.9 HYPOTHYROIDISM, UNSPECIFIED TYPE: ICD-10-CM

## 2024-06-09 DIAGNOSIS — K74.69 OTHER CIRRHOSIS OF LIVER: ICD-10-CM

## 2024-06-10 RX ORDER — LEVOTHYROXINE SODIUM 0.07 MG/1
75 TABLET ORAL DAILY
Qty: 30 TABLET | Refills: 1 | Status: SHIPPED | OUTPATIENT
Start: 2024-06-10

## 2024-06-10 RX ORDER — LEVOTHYROXINE SODIUM 0.07 MG/1
75 TABLET ORAL DAILY
Qty: 90 TABLET | OUTPATIENT
Start: 2024-06-10

## 2024-06-10 NOTE — PROGRESS NOTES
Call results to patient  Thyroid over replaced.  Stop levothyroxine 88mcg 1 daily and start 75mcg 1 po daily, send #[phone] ref.  Recheck TSH in 6 weeks dx hypothyoridism.  Liver enzymes elevated, similar last check.  I ordered labs to check additional liver labs when return in 6 weeks for TSH.  I also recommend liver us that looks for fibrosis (scarring).  I put in orders.  Diabetes ok control, continue work on diet  Cholesterol controlled

## 2024-06-12 ENCOUNTER — HOSPITAL ENCOUNTER (OUTPATIENT)
Dept: PHYSICAL THERAPY | Facility: HOSPITAL | Age: 69
Discharge: HOME | End: 2024-06-12
Admitting: FAMILY MEDICINE
Payer: MEDICARE

## 2024-06-12 DIAGNOSIS — R42 DIZZINESS: Primary | ICD-10-CM

## 2024-06-12 PROCEDURE — 97530 THERAPEUTIC ACTIVITIES: CPT

## 2024-06-12 PROCEDURE — 97161 PT EVAL LOW COMPLEX 20 MIN: CPT

## 2024-06-12 NOTE — THERAPY EVALUATION
Outpatient Physical Therapy Vestibular Initial Evaluation  [facility]     Patient Name: Gerda Bolden  : 1955  MRN: [id number]  Today's Date: 2024      Visit Date: 2024    Patient Active Problem List   Diagnosis    Abnormal CT scan of heart    Abnormal levels of other serum enzymes    Adhesive capsulitis of left shoulder    Anxiety    Arteriosclerosis of coronary artery    Benign essential hypertension    Bilateral hearing loss    Bradycardia    Chest pain due to myocardial ischemia    Chest pain    Ischemic chest pain    Chronic obstructive pulmonary disease    Nausea    Diabetes mellitus    Disequilibrium    Dyslipidemia    Ex-smoker    Fatigue    Hiatal hernia    Hyperlipidemia    Thyroid disease    Immunization due    Increased glucose level    Melanocytic nevus    Mixed anxiety and depressive disorder    High blood pressure    Atrial fibrillation    Postural hypotension    Breast cancer screening    Encounter for follow-up examination after completed treatment for conditions other than malignant neoplasm    Preop cardiovascular exam    Presence of coronary angioplasty implant and graft    Rectal hemorrhage    Shoulder pain    Skin lesion    Syncope    Thickened endometrium    Tobacco user    Unstable angina    Vitamin B12 deficiency    Vitamin D deficiency    Peña's esophagus    Dvrtclos of lg int w/o perforation or abscess w/o bleeding    Early satiety    Gastro-esophageal reflux disease with esophagitis    Hemochromatosis, unspecified    Long term (current) use of anticoagulants    Nonalcoholic steatohepatitis (BELLAMY)    Polyp of colon    Personal history of colonic polyps    Dysphagia    RUQ pain        Past Medical History:   Diagnosis Date    Allergic Statins    Diabetes mellitus     Diverticulosis     GERD (gastroesophageal reflux disease)     HL (hearing loss)     Hyperlipidemia     Hypertension     Hypothyroidism         Past Surgical History:   Procedure Laterality Date     BREAST SURGERY Right     CARDIAC CATHETERIZATION      COLONOSCOPY      CORONARY STENT PLACEMENT  12/08/16    DILATATION AND CURETTAGE      TUBAL ABDOMINAL LIGATION           Visit Dx:     ICD-10-CM ICD-9-CM   1. Dizziness  R42 780.4        Patient History       Row Name 06/12/24 1600             History    Chief Complaint Balance Problems;Dizziness;Difficulty with daily activities  -MO      Brief Description of Current Complaint 69 y/o female pt reports to PT w/o complaints of dizziness onset over the last 8 weeks, which has progressively gotten worse. Dizziness is worst when coming from sitting to standing, sometimes will happen in bed when turning over but not frequently, happens when walking around, gets dizzy spells and just lose balance. Denies any falls but has to quickly reach for something near to center herself. Denies any hx of vertigo in the past. Hasn't be around anyone sick or been sick in the last several months. Since the start of dizzy spells, her drAllie changed her BP medications to see if it would help, hasn't noticed a difference. Additionally she was prescribed meclizine, has taken it daily without any resolve. She does not really associate it with a certain movement in particular. She has always had intermittent ringing in ears, has not had any worsening of symptoms. Sees floaters in lateral eyes but has not had diabetic eye exam. Additionally of note, she has BL hearing loss, feels she has worse loss in the R ear.  -MO         Fall Risk Assessment    Any falls in the past year: No  -MO         Services    Prior Rehab/Home Health Experiences No  -MO         Daily Activities    Primary Language English  -MO      Are you able to read Yes  -MO      Are you able to write Yes  -MO      How does patient learn best? Listening;Reading;Demonstration  -MO      Does patient have problems with the following? None  -MO      Barriers to learning None  -MO      Pt Participated in POC and Goals Yes  -MO          Safety    Are you being hurt, hit, or frightened by anyone at home or in your life? No  -MO      Are you being neglected by a caregiver No  -MO      Have you had any of the following issues with N/A  -MO                User Key  (r) = Recorded By, (t) = Taken By, (c) = Cosigned By      Initials Name Provider Type    Suzanne Rodriguez, PT Physical Therapist                     Vestibular Eval       Row Name 06/12/24 1200             Occulomotor Exam Fixation Present    Occular ROM Normal  -MO      Spontaneous Nystagmus Absent  -MO      Gaze-induced Nystagmus Absent  -MO      Head Shaking Horizontal Absent  -MO      Smooth Pursuit Normal  -MO      Saccades Intact  -MO      Convergence Normal  -MO         Vestibulo-Occular Reflex (VOR)    VOR Cancellation Normal  -MO         Positional Testing    Positional Testing Without infrared goggles  -MO      Vertebrobasilar Artery Screen - Right Negative  -MO      Vertebrobasilar Artery Screen - Left Negative  -MO      Ramah-Hallpike Right No nystagmus  -MO      Barbra-Hallpike Left No nystagmus  -MO      Horizontal Roll Test Right No nystagmus  -MO      Horizontal Roll Test Left No nystagmus  -MO         General ROM    GENERAL ROM COMMENTS cervical ROM WFL  -MO         High-level Balance    High-level Balance Other modified CTSIB 67/120  -MO                User Key  (r) = Recorded By, (t) = Taken By, (c) = Cosigned By      Initials Name Provider Type    Suzanne Rodriguez PT Physical Therapist                                Therapy Education  Education Details: Educated on PT role and POC; purpose of vestibular therapy including anatomy of vestibular system and 3 semi-circular canals; utilized model to enhance understanding. Discussed BPPV vs. Differential diagnoses.  Given: Symptoms/condition management  Program: New  How Provided: Verbal  Provided to: Patient  Level of Understanding: Verbalized       OP Exercises       Row Name 06/12/24 1600 06/12/24 1200          Total Minutes     95993 - PT Therapeutic Activity Minutes -- 8  -MO        Exercise 2    Exercise Name 2 VOR cancellation w/ turns, nods  -MO --     Cueing 2 Verbal;Demo  -MO --     Sets 2 1e  -MO --     Time 2 30s  -MO --               User Key  (r) = Recorded By, (t) = Taken By, (c) = Cosigned By      Initials Name Provider Type    Suzanne Rodriguez, PT Physical Therapist                                 PT OP Goals       Row Name 06/12/24 1200          PT Short Term Goals    STG Date to Achieve 07/12/24  -MO     STG 1 Pt. will be independent with initial HEP to improve self-management of condition.  -MO     STG 1 Progress New  -MO     STG 2 Pt. will tolerate initial VOR exercises without provocation to progress to dynamic challenges.  -MO     STG 2 Progress New  -MO        Long Term Goals    LTG Date to Achieve 08/11/24  -MO     LTG 1 Pt. will be independent with advanced HEP to improve long term management of condition and independence.  -MO     LTG 1 Progress New  -MO     LTG 2 Pt will score </ 15 on DHI (from 28/100 on initial evaluation) to indicate improved perception of disability.  -MO     LTG 2 Progress New  -MO     LTG 3 Pt. will be independent with static fixation to reduce symptoms with transitional movements.  -MO     LTG 3 Progress New  -MO     LTG 4 Pt. will tolerate dynamic VOR without symptom provocation to mimic daily activities.  -MO     LTG 4 Progress New  -MO     LTG 5 Pt. will report 50% improvement in condition to improve QOL.  -MO     LTG 5 Progress New  -MO     LTG 6 Pt will improve modified CTSIB to >/= 90/120 for improved safety with functional mobility.  -MO     LTG 6 Progress New  -MO        Time Calculation    PT Goal Re-Cert Due Date 09/10/24  -MO               User Key  (r) = Recorded By, (t) = Taken By, (c) = Cosigned By      Initials Name Provider Type    Suzanne Rodriguez, PT Physical Therapist                     PT Assessment/Plan       Row Name 06/12/24 1600          PT Assessment    Functional  Limitations Decreased safety during functional activities;Impaired gait;Limitations in community activities;Performance in leisure activities;Performance in self-care ADL  -MO     Impairments Balance;Gait;Coordination  -MO     Assessment Comments Gerda Bolden is a 68 y.o. female referred to physical therapy for vestibular evaluation. She presents with an evolving clinical presentation, along with comorbidities of BL hearing loss, T2 DM, high blood pressure, postural hypotension and no remarkable personal factors that may impact her progress in the plan of care. Upon evaluation this date, she has onset of dizziness symptoms throughout all oculomotor tests however exam is unremarkable otherwise, with no instances of nystagmus or abnormal eye movement noted. She is (-) for BPPV testing bilaterally in all canals, scores 67/120 on modified CTSIB, and subjectively reports 28/100 on DHI, any scores >15/100 indicate handicap. Her signs and symptoms are not consistent with BPPV however can not rule out other potential vestibular dysfunctions such as neuritis and overall hypofunction as she does have notable impairments. The previous impairments limit her ability to safely complete daily ADLs, drive, participate in any recreational activities, and place her at a higher risk for falls. Pt will benefit from skilled PT to address the previous impairments and return to PLOF.  -MO     Please refer to paper survey for additional self-reported information No  -MO     Rehab Potential Fair  -MO     Patient/caregiver participated in establishment of treatment plan and goals Yes  -MO     Patient would benefit from skilled therapy intervention Yes  -MO        PT Plan    PT Frequency 1x/week;2x/week  -MO     Predicted Duration of Therapy Intervention (PT) 3-4 visits  -MO     Planned CPT's? PT EVAL LOW COMPLEXITY: 83983;PT RE-EVAL: 22174;PT THER PROC EA 15 MIN: 29913;PT THER ACT EA 15 MIN: 93433;PT MANUAL THERAPY EA 15 MIN: 20227;PT  NEUROMUSC RE-EDUCATION EA 15 MIN: 25509;PT GAIT TRAINING EA 15 MIN: 56166;PT SELF CARE/HOME MGMT/TRAIN EA 15: 69481  -MO     PT Plan Comments reassess BPPV as needed. cancellation exercises. diana downs exercise  -MO               User Key  (r) = Recorded By, (t) = Taken By, (c) = Cosigned By      Initials Name Provider Type    Suzanne Rodriguez, PT Physical Therapist                               Time Calculation:   Start Time: 1155  Stop Time: 1233  Time Calculation (min): 38 min  Timed Charges  38608 - PT Therapeutic Activity Minutes: 8  Untimed Charges  PT Eval/Re-eval Minutes: 30  Total Minutes  Timed Charges Total Minutes: 8  Untimed Charges Total Minutes: 30   Total Minutes: 38   Therapy Charges for Today       Code Description Service Date Service Provider Modifiers Qty    82082497861 HC PT THERAPEUTIC ACT EA 15 MIN 6/12/2024 Suzanne Waller, PT GP 1    61257769073 HC PT EVAL LOW COMPLEXITY 2 6/12/2024 Suzanne Waller, PT GP 1                      Suzanne Waller PT  6/12/2024

## 2024-06-13 ENCOUNTER — TRANSCRIBE ORDERS (OUTPATIENT)
Dept: ADMINISTRATIVE | Facility: HOSPITAL | Age: 69
End: 2024-06-13
Payer: MEDICARE

## 2024-06-20 ENCOUNTER — HOSPITAL ENCOUNTER (OUTPATIENT)
Facility: HOSPITAL | Age: 69
Discharge: HOME | End: 2024-06-20
Payer: MEDICARE

## 2024-06-20 DIAGNOSIS — R74.8 ELEVATED LIVER ENZYMES: ICD-10-CM

## 2024-06-20 DIAGNOSIS — K76.0 HEPATIC STEATOSIS: ICD-10-CM

## 2024-06-20 PROCEDURE — 76981 USE PARENCHYMA: CPT

## 2024-06-20 PROCEDURE — 76705 ECHO EXAM OF ABDOMEN: CPT

## 2024-06-23 NOTE — PATIENT INSTRUCTIONS
Advance Care Planning and Advance Directives     You make decisions on a daily basis - decisions about where you want to live, your career, your home, your life. Perhaps one of the most important decisions you face is your choice for future medical care. Take time to talk with your family and your healthcare team and start planning today.  Advance Care Planning is a process that can help you:  Understand possible future healthcare decisions in light of your own experiences  Reflect on those decision in light of your goals and values  Discuss your decisions with those closest to you and the healthcare professionals that care for you  Make a plan by creating a document that reflects your wishes    Surrogate Decision Maker  In the event of a medical emergency, which has left you unable to communicate or to make your own decisions, you would need someone to make decisions for you.  It is important to discuss your preferences for medical treatment with this person while you are in good health.     Qualities of a surrogate decision maker:  Willing to take on this role and responsibility  Knows what you want for future medical care  Willing to follow your wishes even if they don't agree with them  Able to make difficult medical decisions under stressful circumstances    Advance Directives  These are legal documents you can create that will guide your healthcare team and decision maker(s) when needed. These documents can be stored in the electronic medical record.    Living Will - a legal document to guide your care if you have a terminal condition or a serious illness and are unable to communicate. States vary by statute in document names/types, but most forms may include one or more of the following:        -  Directions regarding life-prolonging treatments        -  Directions regarding artificially provided nutrition/hydration        -  Choosing a healthcare decision maker        -  Direction regarding organ/tissue  donation    Durable Power of  for Healthcare - this document names an -in-fact to make medical decisions for you, but it may also allow this person to make personal and financial decisions for you. Please seek the advice of an  if you need this type of document.    **Advance Directives are not required and no one may discriminate against you if you do not sign one.    Medical Orders  Many states allow specific forms/orders signed by your physician to record your wishes for medical treatment in your current state of health. This form, signed in personal communication with your physician, addresses resuscitation and other medical interventions that you may or may not want.      For more information or to schedule a time with a [facility] Advance Care Planning Facilitator contact: [facility] or call [phone] and someone will contact you directly.Calorie Counting for Weight Loss  Calories are units of energy. Your body needs a certain number of calories from food to keep going throughout the day. When you eat or drink more calories than your body needs, your body stores the extra calories mostly as fat. When you eat or drink fewer calories than your body needs, your body burns fat to get the energy it needs.  Calorie counting means keeping track of how many calories you eat and drink each day. Calorie counting can be helpful if you need to lose weight. If you eat fewer calories than your body needs, you should lose weight. Ask your health care provider what a healthy weight is for you.  For calorie counting to work, you will need to eat the right number of calories each day to lose a healthy amount of weight per week. A dietitian can help you figure out how many calories you need in a day and will suggest ways to reach your calorie goal.  A healthy amount of weight to lose each week is usually 1-2 lb (0.5-0.9 kg). This usually means that your daily calorie intake should be  reduced by 500-750 calories.  Eating 1,200-1,500 calories a day can help most women lose weight.  Eating 1,500-1,800 calories a day can help most men lose weight.  What do I need to know about calorie counting?  Work with your health care provider or dietitian to determine how many calories you should get each day. To meet your daily calorie goal, you will need to:  Find out how many calories are in each food that you would like to eat. Try to do this before you eat.  Decide how much of the food you plan to eat.  Keep a food log. Do this by writing down what you ate and how many calories it had.  To successfully lose weight, it is important to balance calorie counting with a healthy lifestyle that includes regular activity.  Where do I find calorie information?  The number of calories in a food can be found on a Nutrition Facts label. If a food does not have a Nutrition Facts label, try to look up the calories online or ask your dietitian for help.  Remember that calories are listed per serving. If you choose to have more than one serving of a food, you will have to multiply the calories per serving by the number of servings you plan to eat. For example, the label on a package of bread might say that a serving size is 1 slice and that there are 90 calories in a serving. If you eat 1 slice, you will have eaten 90 calories. If you eat 2 slices, you will have eaten 180 calories.  How do I keep a food log?  After each time that you eat, record the following in your food log as soon as possible:  What you ate. Be sure to include toppings, sauces, and other extras on the food.  How much you ate. This can be measured in cups, ounces, or number of items.  How many calories were in each food and drink.  The total number of calories in the food you ate.  Keep your food log near you, such as in a pocket-sized notebook or on an junior or website on your mobile phone. Some programs will calculate calories for you and show you how  many calories you have left to meet your daily goal.  What are some portion-control tips?  Know how many calories are in a serving. This will help you know how many servings you can have of a certain food.  Use a measuring cup to measure serving sizes. You could also try weighing out portions on a kitchen scale. With time, you will be able to estimate serving sizes for some foods.  Take time to put servings of different foods on your favorite plates or in your favorite bowls and cups so you know what a serving looks like.  Try not to eat straight from a food's packaging, such as from a bag or box. Eating straight from the package makes it hard to see how much you are eating and can lead to overeating. Put the amount you would like to eat in a cup or on a plate to make sure you are eating the right portion.  Use smaller plates, glasses, and bowls for smaller portions and to prevent overeating.  Try not to multitask. For example, avoid watching TV or using your computer while eating. If it is time to eat, sit down at a table and enjoy your food. This will help you recognize when you are full. It will also help you be more mindful of what and how much you are eating.  What are tips for following this plan?  Reading food labels  Check the calorie count compared with the serving size. The serving size may be smaller than what you are used to eating.  Check the source of the calories. Try to choose foods that are high in protein, fiber, and vitamins, and low in saturated fat, trans fat, and sodium.  Shopping  Read nutrition labels while you shop. This will help you make healthy decisions about which foods to buy.  Pay attention to nutrition labels for low-fat or fat-free foods. These foods sometimes have the same number of calories or more calories than the full-fat versions. They also often have added sugar, starch, or salt to make up for flavor that was removed with the fat.  Make a grocery list of lower-calorie foods  and stick to it.  Cooking  Try to cook your favorite foods in a healthier way. For example, try baking instead of frying.  Use low-fat dairy products.  Meal planning  Use more fruits and vegetables. One-half of your plate should be fruits and vegetables.  Include lean proteins, such as chicken, turkey, and fish.  Lifestyle  Each week, aim to do one of the followin minutes of moderate exercise, such as walking.  75 minutes of vigorous exercise, such as running.  General information  Know how many calories are in the foods you eat most often. This will help you calculate calorie counts faster.  Find a way of tracking calories that works for you. Get creative. Try different apps or programs if writing down calories does not work for you.  What foods should I eat?    Eat nutritious foods. It is better to have a nutritious, high-calorie food, such as an avocado, than a food with few nutrients, such as a bag of potato chips.  Use your calories on foods and drinks that will fill you up and will not leave you hungry soon after eating.  Examples of foods that fill you up are nuts and nut butters, vegetables, lean proteins, and high-fiber foods such as whole grains. High-fiber foods are foods with more than 5 g of fiber per serving.  Pay attention to calories in drinks. Low-calorie drinks include water and unsweetened drinks.  The items listed above may not be a complete list of foods and beverages you can eat. Contact a dietitian for more information.  What foods should I limit?  Limit foods or drinks that are not good sources of vitamins, minerals, or protein or that are high in unhealthy fats. These include:  Candy.  Other sweets.  Sodas, specialty coffee drinks, alcohol, and juice.  The items listed above may not be a complete list of foods and beverages you should avoid. Contact a dietitian for more information.  How do I count calories when eating out?  Pay attention to portions. Often, portions are much larger  when eating out. Try these tips to keep portions smaller:  Consider sharing a meal instead of getting your own.  If you get your own meal, eat only half of it. Before you start eating, ask for a container and put half of your meal into it.  When available, consider ordering smaller portions from the menu instead of full portions.  Pay attention to your food and drink choices. Knowing the way food is cooked and what is included with the meal can help you eat fewer calories.  If calories are listed on the menu, choose the lower-calorie options.  Choose dishes that include vegetables, fruits, whole grains, low-fat dairy products, and lean proteins.  Choose items that are boiled, broiled, grilled, or steamed. Avoid items that are buttered, battered, fried, or served with cream sauce. Items labeled as crispy are usually fried, unless stated otherwise.  Choose water, low-fat milk, unsweetened iced tea, or other drinks without added sugar. If you want an alcoholic beverage, choose a lower-calorie option, such as a glass of wine or light beer.  Ask for dressings, sauces, and syrups on the side. These are usually high in calories, so you should limit the amount you eat.  If you want a salad, choose a garden salad and ask for grilled meats. Avoid extra toppings such as pickard, cheese, or fried items. Ask for the dressing on the side, or ask for olive oil and vinegar or lemon to use as dressing.  Estimate how many servings of a food you are given. Knowing serving sizes will help you be aware of how much food you are eating at restaurants.  Where to find more information  Centers for Disease Control and Prevention: www.cdc.gov  [place] Department of Agriculture: myplate.gov  Summary  Calorie counting means keeping track of how many calories you eat and drink each day. If you eat fewer calories than your body needs, you should lose weight.  A healthy amount of weight to lose per week is usually 1-2 lb (0.5-0.9 kg). This usually  means reducing your daily calorie intake by 500-750 calories.  The number of calories in a food can be found on a Nutrition Facts label. If a food does not have a Nutrition Facts label, try to look up the calories online or ask your dietitian for help.  Use smaller plates, glasses, and bowls for smaller portions and to prevent overeating.  Use your calories on foods and drinks that will fill you up and not leave you hungry shortly after a meal.  This information is not intended to replace advice given to you by your health care provider. Make sure you discuss any questions you have with your health care provider.  Document Revised: 01/28/2021 Document Reviewed: 01/28/2021  [employer] Patient Education © 2022 [employer]    Exercising to Lose Weight  Getting regular exercise is important for everyone. It is especially important if you are overweight. Being overweight increases your risk of heart disease, stroke, diabetes, high blood pressure, and several types of cancer. Exercising, and reducing the calories you consume, can help you lose weight and improve fitness and health.  Exercise can be moderate or vigorous intensity. To lose weight, most people need to do a certain amount of moderate or vigorous-intensity exercise each week.  How can exercise affect me?  You lose weight when you exercise enough to burn more calories than you eat. Exercise also reduces body fat and builds muscle. The more muscle you have, the more calories you burn. Exercise also:  Improves mood.  Reduces stress and tension.  Improves your overall fitness, flexibility, and endurance.  Increases bone strength.  Moderate-intensity exercise  Moderate-intensity exercise is any activity that gets you moving enough to burn at least three times more energy (calories) than if you were sitting.  Examples of moderate exercise include:  Walking a mile in 15 minutes.  Doing light yard work.  Biking at an easy pace.  Most people should get at least 150  minutes of moderate-intensity exercise a week to maintain their body weight.  Vigorous-intensity exercise  Vigorous-intensity exercise is any activity that gets you moving enough to burn at least six times more calories than if you were sitting. When you exercise at this intensity, you should be working hard enough that you are not able to carry on a conversation.  Examples of vigorous exercise include:  Running.  Playing a team sport, such as football, basketball, and soccer.  Jumping rope.  Most people should get at least 75 minutes a week of vigorous exercise to maintain their body weight.  What actions can I take to lose weight?  The amount of exercise you need to lose weight depends on:  Your age.  The type of exercise.  Any health conditions you have.  Your overall physical ability.  Talk to your health care provider about how much exercise you need and what types of activities are safe for you.  Nutrition    Make changes to your diet as told by your health care provider or diet and nutrition specialist (dietitian). This may include:  Eating fewer calories.  Eating more protein.  Eating less unhealthy fats.  Eating a diet that includes fresh fruits and vegetables, whole grains, low-fat dairy products, and lean protein.  Avoiding foods with added fat, salt, and sugar.  Drink plenty of water while you exercise to prevent dehydration or heat stroke.  Activity  Choose an activity that you enjoy and set realistic goals. Your health care provider can help you make an exercise plan that works for you.  Exercise at a moderate or vigorous intensity most days of the week.  The intensity of exercise may vary from person to person. You can tell how intense a workout is for you by paying attention to your breathing and heartbeat. Most people will notice their breathing and heartbeat get faster with more intense exercise.  Do resistance training twice each week, such as:  Push-ups.  Sit-ups.  Lifting weights.  Using  resistance bands.  Getting short amounts of exercise can be just as helpful as long, structured periods of exercise. If you have trouble finding time to exercise, try doing these things as part of your daily routine:  Get up, stretch, and walk around every 30 minutes throughout the day.  Go for a walk during your lunch break.  Park your car farther away from your destination.  If you take public transportation, get off one stop early and walk the rest of the way.  Make phone calls while standing up and walking around.  Take the stairs instead of elevators or escalators.  Wear comfortable clothes and shoes with good support.  Do not exercise so much that you hurt yourself, feel dizzy, or get very short of breath.  Where to find more information  [place] Department of Health and Human Services: www.hhs.gov  Centers for Disease Control and Prevention: www.cdc.gov  Contact a health care provider:  Before starting a new exercise program.  If you have questions or concerns about your weight.  If you have a medical problem that keeps you from exercising.  Get help right away if:  You have any of the following while exercising:  Injury.  Dizziness.  Difficulty breathing or shortness of breath that does not go away when you stop exercising.  Chest pain.  Rapid heartbeat.  These symptoms may represent a serious problem that is an emergency. Do not wait to see if the symptoms will go away. Get medical help right away. Call your local emergency services (911 in the [place] Do not drive yourself to the hospital.  Summary  Getting regular exercise is especially important if you are overweight.  Being overweight increases your risk of heart disease, stroke, diabetes, high blood pressure, and several types of cancer.  Losing weight happens when you burn more calories than you eat.  Reducing the amount of calories you eat, and getting regular moderate or vigorous exercise each week, helps you lose weight.  This information is not  intended to replace advice given to you by your health care provider. Make sure you discuss any questions you have with your health care provider.  Document Revised: 02/13/2022 Document Reviewed: 02/13/2022  [employer] Patient Education © 2022 [employer]

## 2024-07-08 DIAGNOSIS — E11.9 TYPE 2 DIABETES MELLITUS WITHOUT COMPLICATION, WITHOUT LONG-TERM CURRENT USE OF INSULIN: ICD-10-CM

## 2024-07-08 RX ORDER — METFORMIN HYDROCHLORIDE 500 MG/1
1000 TABLET, EXTENDED RELEASE ORAL
Qty: 360 TABLET | Refills: 3 | Status: SHIPPED | OUTPATIENT
Start: 2024-07-08

## 2024-07-15 ENCOUNTER — OFFICE (OUTPATIENT)
Dept: URBAN - METROPOLITAN AREA CLINIC 76 | Facility: CLINIC | Age: 69
End: 2024-07-15
Payer: OTHER GOVERNMENT

## 2024-07-15 VITALS
WEIGHT: 162 LBS | SYSTOLIC BLOOD PRESSURE: 138 MMHG | RESPIRATION RATE: 20 BRPM | HEART RATE: 71 BPM | DIASTOLIC BLOOD PRESSURE: 77 MMHG | HEIGHT: 65 IN

## 2024-07-15 DIAGNOSIS — K21.9 GASTRO-ESOPHAGEAL REFLUX DISEASE WITHOUT ESOPHAGITIS: ICD-10-CM

## 2024-07-15 DIAGNOSIS — R13.10 DYSPHAGIA, UNSPECIFIED: ICD-10-CM

## 2024-07-15 DIAGNOSIS — K22.70 BARRETT'S ESOPHAGUS WITHOUT DYSPLASIA: ICD-10-CM

## 2024-07-15 DIAGNOSIS — Z86.010 PERSONAL HISTORY OF COLONIC POLYPS: ICD-10-CM

## 2024-07-15 DIAGNOSIS — K75.81 NONALCOHOLIC STEATOHEPATITIS (NASH): ICD-10-CM

## 2024-07-15 DIAGNOSIS — R74.8 ABNORMAL LEVELS OF OTHER SERUM ENZYMES: ICD-10-CM

## 2024-07-15 PROBLEM — K63.5: Status: ACTIVE | Noted: 2022-04-26

## 2024-07-15 PROCEDURE — 99214 OFFICE O/P EST MOD 30 MIN: CPT | Performed by: INTERNAL MEDICINE

## 2024-08-23 DIAGNOSIS — H81.13 BPV (BENIGN POSITIONAL VERTIGO), BILATERAL: ICD-10-CM

## 2024-08-23 RX ORDER — MECLIZINE HYDROCHLORIDE 25 MG/1
25 TABLET ORAL 3 TIMES DAILY PRN
Qty: 90 TABLET | Refills: 0 | Status: SHIPPED | OUTPATIENT
Start: 2024-08-23

## 2024-09-04 ENCOUNTER — OFFICE VISIT (OUTPATIENT)
Dept: FAMILY MEDICINE CLINIC | Facility: CLINIC | Age: 69
End: 2024-09-04
Payer: MEDICARE

## 2024-09-04 VITALS
SYSTOLIC BLOOD PRESSURE: 128 MMHG | WEIGHT: 154 LBS | OXYGEN SATURATION: 95 % | HEIGHT: 65 IN | HEART RATE: 55 BPM | BODY MASS INDEX: 25.66 KG/M2 | DIASTOLIC BLOOD PRESSURE: 70 MMHG

## 2024-09-04 DIAGNOSIS — N39.0 ACUTE UTI: ICD-10-CM

## 2024-09-04 DIAGNOSIS — K75.81 NASH (NONALCOHOLIC STEATOHEPATITIS): ICD-10-CM

## 2024-09-04 DIAGNOSIS — Z11.59 ENCOUNTER FOR SCREENING FOR OTHER VIRAL DISEASES: ICD-10-CM

## 2024-09-04 DIAGNOSIS — K74.69 OTHER CIRRHOSIS OF LIVER: ICD-10-CM

## 2024-09-04 DIAGNOSIS — E03.9 ACQUIRED HYPOTHYROIDISM: ICD-10-CM

## 2024-09-04 DIAGNOSIS — E11.9 TYPE 2 DIABETES MELLITUS WITHOUT COMPLICATION, WITHOUT LONG-TERM CURRENT USE OF INSULIN: Primary | ICD-10-CM

## 2024-09-04 PROCEDURE — 3078F DIAST BP <80 MM HG: CPT | Performed by: FAMILY MEDICINE

## 2024-09-04 PROCEDURE — 1126F AMNT PAIN NOTED NONE PRSNT: CPT | Performed by: FAMILY MEDICINE

## 2024-09-04 PROCEDURE — 99214 OFFICE O/P EST MOD 30 MIN: CPT | Performed by: FAMILY MEDICINE

## 2024-09-04 PROCEDURE — 3074F SYST BP LT 130 MM HG: CPT | Performed by: FAMILY MEDICINE

## 2024-09-04 PROCEDURE — 3044F HG A1C LEVEL LT 7.0%: CPT | Performed by: FAMILY MEDICINE

## 2024-09-04 RX ORDER — AMITRIPTYLINE HYDROCHLORIDE 10 MG/1
10 TABLET ORAL DAILY
COMMUNITY

## 2024-09-04 RX ORDER — CEPHALEXIN 500 MG/1
500 CAPSULE ORAL 2 TIMES DAILY
Qty: 14 CAPSULE | Refills: 0 | Status: SHIPPED | OUTPATIENT
Start: 2024-09-04

## 2024-09-04 NOTE — PROGRESS NOTES
Subjective   Gerda Bolden is a 68 y.o. female. Presents today for   Chief Complaint   Patient presents with    Hypothyroidism    Diabetes    Liver Eval       History of Present Illness  Patient 69 y/o female here for dm2, hypothyoridism and f/u liver tests;  Elevated LFTS, us noted fatty liver and fibrosis;  Denies liver failure stigmata;  Sees GI regularly and has seen since.   Reports blood sugars did better on short acting metformin than ER so switched herself back;  Las A1c 6.9%;  She had thyroid med adjusted and needs again.  She has had off/on stabbing chest pain for well over a year and unchanged;  had PET CT cardiac and ECHO for this last year that was normal.   REports urinary frequency, dysuria and suprapubic pressure, feels like has UTI.  Review of Systems   Respiratory:  Negative for shortness of breath and wheezing.    Cardiovascular:  Negative for chest pain and palpitations.   Gastrointestinal:  Negative for abdominal distention, abdominal pain and blood in stool.   Genitourinary:  Positive for dysuria and frequency.       Patient Active Problem List   Diagnosis    Abnormal CT scan of heart    Abnormal levels of other serum enzymes    Adhesive capsulitis of left shoulder    Anxiety    Arteriosclerosis of coronary artery    Benign essential hypertension    Bilateral hearing loss    Bradycardia    Chest pain due to myocardial ischemia    Chest pain    Ischemic chest pain    Chronic obstructive pulmonary disease    Nausea    Diabetes mellitus    Disequilibrium    Dyslipidemia    Ex-smoker    Fatigue    Hiatal hernia    Hyperlipidemia    Thyroid disease    Immunization due    Increased glucose level    Melanocytic nevus    Mixed anxiety and depressive disorder    High blood pressure    Atrial fibrillation    Postural hypotension    Breast cancer screening    Encounter for follow-up examination after completed treatment for conditions other than malignant neoplasm    Preop cardiovascular exam    Presence  of coronary angioplasty implant and graft    Rectal hemorrhage    Shoulder pain    Skin lesion    Syncope    Thickened endometrium    Tobacco user    Unstable angina    Vitamin B12 deficiency    Vitamin D deficiency    Peña's esophagus    Dvrtclos of lg int w/o perforation or abscess w/o bleeding    Early satiety    Gastro-esophageal reflux disease with esophagitis    Hemochromatosis, unspecified    Long term (current) use of anticoagulants    Nonalcoholic steatohepatitis (BELLAMY)    Polyp of colon    Personal history of colonic polyps    Dysphagia    RUQ pain       Social History     Socioeconomic History    Marital status:    Tobacco Use    Smoking status: Former     Current packs/day: 0.00     Average packs/day: 1.5 packs/day for 46.0 years (69.0 ttl pk-yrs)     Types: Cigarettes     Start date: 1971     Quit date:      Years since quittin.6    Smokeless tobacco: Never   Vaping Use    Vaping status: Never Used   Substance and Sexual Activity    Alcohol use: Never    Drug use: Never    Sexual activity: Defer       Allergies   Allergen Reactions    Ezetimibe Unknown - Low Severity    Statins Other (See Comments)     Muscle aches       Current Outpatient Medications on File Prior to Visit   Medication Sig Dispense Refill    Alirocumab (Praluent) 75 MG/ML solution auto-injector Inject 1 mL under the skin into the appropriate area as directed Every 14 (Fourteen) Days. 6 mL 3    amLODIPine (NORVASC) 5 MG tablet Take 0.5 tablets by mouth Daily. 45 tablet 3    aspirin 81 MG EC tablet Take 1 tablet by mouth Daily.      Calcium Carbonate Antacid (CALCIUM CARBONATE PO) tablet      Cholecalciferol (Vitamin D3) 50 MCG (2000 UT) capsule Take 1 capsule by mouth Daily. 90 capsule 0    dexlansoprazole (DEXILANT) 60 MG capsule Take 1 capsule by mouth Daily.      isosorbide mononitrate (IMDUR) 30 MG 24 hr tablet Take 1 tablet by mouth Daily.      levothyroxine (Synthroid) 75 MCG tablet Take 1 tablet by mouth  "Daily. 30 tablet 1    meclizine (ANTIVERT) 25 MG tablet TAKE 1 TABLET BY MOUTH THREE TIMES DAILY AS NEEDED FOR DIZZINESS 90 tablet 0    Vitamin E 180 MG (400 UNIT) capsule capsule Take 1 capsule by mouth Daily. 90 capsule 1    [DISCONTINUED] metFORMIN ER (GLUCOPHAGE-XR) 500 MG 24 hr tablet Take 2 tablets by mouth 2 (Two) Times a Day Before Meals. 360 tablet 3    amitriptyline (ELAVIL) 10 MG tablet Take 1 tablet by mouth Daily.      nitroglycerin (NITROSTAT) 0.4 MG SL tablet PLACE ONE TABLET UNDER THE TONGUE EVERY 5 MINUTES AS NEEDED FOR CHEST PAIN (Patient not taking: Reported on 6/4/2024)      [DISCONTINUED] Vitamin E (E-200) 90 MG (200 UNIT) capsule capsule (Patient not taking: Reported on 9/4/2024)      [DISCONTINUED] zolpidem (Ambien) 5 MG tablet Take 1 tablet by mouth At Night As Needed for Sleep (Bring to the sleep lab, do not take at home). (Patient not taking: Reported on 9/4/2024) 1 tablet 0     No current facility-administered medications on file prior to visit.       Objective   Vitals:    09/04/24 1354   BP: 128/70   Pulse: 55   SpO2: 95%   Weight: 69.9 kg (154 lb)   Height: 165.1 cm (65\")     Body mass index is 25.63 kg/m².    Physical Exam  Vitals and nursing note reviewed.   Constitutional:       Appearance: She is well-developed.   HENT:      Head: Normocephalic and atraumatic.   Neck:      Thyroid: No thyromegaly.      Vascular: No JVD.   Cardiovascular:      Rate and Rhythm: Normal rate and regular rhythm.      Heart sounds: Normal heart sounds. No murmur heard.     No friction rub. No gallop.   Pulmonary:      Effort: Pulmonary effort is normal. No respiratory distress.      Breath sounds: Normal breath sounds. No wheezing or rales.   Abdominal:      General: Bowel sounds are normal. There is no distension.      Palpations: Abdomen is soft.      Tenderness: There is no abdominal tenderness. There is no guarding or rebound.   Musculoskeletal:      Cervical back: Neck supple.   Skin:     General: " Skin is warm and dry.   Neurological:      Mental Status: She is alert.      Gait: Gait normal.   Psychiatric:         Behavior: Behavior normal.         Assessment & Plan   Diagnoses and all orders for this visit:    1. Type 2 diabetes mellitus without complication, without long-term current use of insulin (Primary)  -     metFORMIN (GLUCOPHAGE) 500 MG tablet; Take 1 tablet by mouth 2 (Two) Times a Day With Meals.  Dispense: 180 tablet; Refill: 3    2. Other cirrhosis of liver  -     A1A, Quant & Genotype (Rfx Pheno)  -     AFP Tumor Marker  -     JOHAN With / DsDNA, RNP, Sjogrens A / B, Smith  -     Anti-Smooth Muscle Antibody Titer  -     Ceruloplasmin  -     C-reactive Protein  -     Hepatic Function Panel  -     Hepatitis Panel, Acute  -     Mitochondrial Antibodies, M2  -     PT & PTT ([facility])  -     Sedimentation Rate  -     Hepatitis B Surface Antibody    3. BELLAMY (nonalcoholic steatohepatitis)  -     A1A, Quant & Genotype ([facility] Phen[facility])  -     AFP Tumor Marker  -     JOHAN With / DsDNA, RNP, Sjogrens A / B, Smith  -     Anti-Smooth Muscle Antibody Titer  -     Ceruloplasmin  -     C-reactive Protein  -     Hepatic Function Panel  -     Hepatitis Panel, Acute  -     Mitochondrial Antibodies, M2  -     PT & PTT ([facility])  -     Sedimentation Rate  -     Hepatitis B Surface Antibody    4. Acquired hypothyroidism  -     TSH    5. Acute UTI  -     Urinalysis With Microscopic - Urine, Clean Catch  -     Urine Culture - , Urine, Clean Catch  -     cephalexin (KEFLEX) 500 MG capsule; Take 1 capsule by mouth 2 (Two) Times a Day.  Dispense: 14 capsule; Refill: 0    6. Encounter for screening for other viral diseases  -     Hepatitis B Surface Antibody    Dm2- continue metfomrin oik  Check liver work-up labs as directed  Push fluchicho, check urine studies and start abx in interim  -hypothyroidism - continue medication, recheck thyroid labs.                 -Follow up: 6 months and prn

## 2024-09-12 LAB
A1A RFX TO PHENOTYPE: NORMAL
A1AT SERPL-MCNC: 182 MG/DL (ref 101–187)
AFP-TM SERPL-MCNC: 11.3 NG/ML (ref 0–9.2)
ALBUMIN SERPL-MCNC: 4.6 G/DL (ref 3.9–4.9)
ALP SERPL-CCNC: 118 IU/L (ref 44–121)
ALT SERPL-CCNC: 42 IU/L (ref 0–32)
ANA SER QL: NEGATIVE
APPEARANCE UR: CLEAR
APTT PPP: 27 SEC (ref 24–33)
AST SERPL-CCNC: 41 IU/L (ref 0–40)
BACTERIA #/AREA URNS HPF: NORMAL /[HPF]
BACTERIA UR CULT: NO GROWTH
BACTERIA UR CULT: NORMAL
BILIRUB DIRECT SERPL-MCNC: 0.17 MG/DL (ref 0–0.4)
BILIRUB SERPL-MCNC: 0.5 MG/DL (ref 0–1.2)
BILIRUB UR QL STRIP: NEGATIVE
CASTS URNS QL MICRO: NORMAL /LPF
CERULOPLASMIN SERPL-MCNC: 32.4 MG/DL (ref 19–39)
COLOR UR: YELLOW
CRP SERPL-MCNC: 14 MG/L (ref 0–10)
EPI CELLS #/AREA URNS HPF: NORMAL /HPF (ref 0–10)
ERYTHROCYTE [SEDIMENTATION RATE] IN BLOOD BY WESTERGREN METHOD: 8 MM/HR (ref 0–40)
GLUCOSE UR QL STRIP: NEGATIVE
HAV IGM SERPL QL IA: NEGATIVE
HBV CORE IGM SERPL QL IA: NEGATIVE
HBV SURFACE AB SER QL: NON REACTIVE
HBV SURFACE AG SERPL QL IA: NEGATIVE
HCV AB SERPL QL IA: NORMAL
HCV IGG SERPL QL IA: NON REACTIVE
HGB UR QL STRIP: NEGATIVE
INR PPP: 1 (ref 0.9–1.2)
KETONES UR QL STRIP: NEGATIVE
LAB DIRECTOR NAME PROVIDER: NORMAL
LEUKOCYTE ESTERASE UR QL STRIP: ABNORMAL
MICRO URNS: ABNORMAL
MITOCHONDRIA M2 IGG SER-ACNC: <20 UNITS (ref 0–20)
NITRITE UR QL STRIP: NEGATIVE
PH UR STRIP: 6.5 [PH] (ref 5–7.5)
PROT SERPL-MCNC: 7.1 G/DL (ref 6–8.5)
PROT UR QL STRIP: NEGATIVE
PROTHROMBIN TIME: 10.8 SEC (ref 9.1–12)
RBC #/AREA URNS HPF: NORMAL /HPF (ref 0–2)
SERPINA1 GENE MUT ANL BLD/T: NORMAL
SERPINA1 GENE MUT TESTED BLD/T: NORMAL
SMA IGG SER-ACNC: 5 UNITS (ref 0–19)
SP GR UR STRIP: 1.01 (ref 1–1.03)
TSH SERPL DL<=0.005 MIU/L-ACNC: 1.42 UIU/ML (ref 0.45–4.5)
UROBILINOGEN UR STRIP-MCNC: 1 MG/DL (ref 0.2–1)
WBC #/AREA URNS HPF: NORMAL /HPF (ref 0–5)

## 2024-09-16 ENCOUNTER — TELEPHONE (OUTPATIENT)
Dept: FAMILY MEDICINE CLINIC | Facility: CLINIC | Age: 69
End: 2024-09-16

## 2024-09-16 NOTE — TELEPHONE ENCOUNTER
Caller: Gerda Bolden    Relationship: Self    Best call back number:     Caller requesting test results:     What test was performed: LABS    When was the test performed: 09/04/24    Where was the test performed: OFFICE LAB    Additional notes: PATIENT IS CALLING IN TO REQUEST HER LAB RESULTS.  SHE WANTS TO BE CALLED TO DISCUSS.

## 2024-09-19 ENCOUNTER — CLINICAL SUPPORT (OUTPATIENT)
Dept: FAMILY MEDICINE CLINIC | Facility: CLINIC | Age: 69
End: 2024-09-19
Payer: MEDICARE

## 2024-09-19 DIAGNOSIS — Z23 NEED FOR HEPATITIS B VACCINATION: Primary | ICD-10-CM

## 2024-09-19 DIAGNOSIS — Z23 NEED FOR HEPATITIS A IMMUNIZATION: ICD-10-CM

## 2024-09-25 ENCOUNTER — HOSPITAL ENCOUNTER (OUTPATIENT)
Dept: MAMMOGRAPHY | Facility: HOSPITAL | Age: 69
Discharge: HOME | End: 2024-09-25
Payer: MEDICARE

## 2024-09-25 ENCOUNTER — TELEPHONE (OUTPATIENT)
Dept: FAMILY MEDICINE CLINIC | Facility: CLINIC | Age: 69
End: 2024-09-25
Payer: MEDICARE

## 2024-09-25 ENCOUNTER — HOSPITAL ENCOUNTER (OUTPATIENT)
Dept: ULTRASOUND IMAGING | Facility: HOSPITAL | Age: 69
Discharge: HOME | End: 2024-09-25
Payer: MEDICARE

## 2024-09-25 DIAGNOSIS — R92.8 ABNORMAL MAMMOGRAM: ICD-10-CM

## 2024-09-25 DIAGNOSIS — R92.8 ABNORMAL MAMMOGRAM: Primary | ICD-10-CM

## 2024-09-25 DIAGNOSIS — Z12.31 VISIT FOR SCREENING MAMMOGRAM: ICD-10-CM

## 2024-09-25 PROCEDURE — 76642 ULTRASOUND BREAST LIMITED: CPT

## 2024-09-25 PROCEDURE — 77065 DX MAMMO INCL CAD UNI: CPT

## 2024-09-25 PROCEDURE — G0279 TOMOSYNTHESIS, MAMMO: HCPCS

## 2024-10-01 DIAGNOSIS — R00.2 PALPITATIONS: Primary | ICD-10-CM

## 2024-10-14 ENCOUNTER — HOSPITAL ENCOUNTER (OUTPATIENT)
Dept: CARDIOLOGY | Facility: HOSPITAL | Age: 69
Discharge: HOME | End: 2024-10-14
Admitting: FAMILY MEDICINE
Payer: MEDICARE

## 2024-10-14 DIAGNOSIS — R00.2 PALPITATIONS: ICD-10-CM

## 2024-10-14 PROCEDURE — 93246 EXT ECG>7D<15D RECORDING: CPT

## 2024-10-17 RX ORDER — LEVOTHYROXINE SODIUM 75 UG/1
75 TABLET ORAL DAILY
Qty: 30 TABLET | Refills: 5 | Status: SHIPPED | OUTPATIENT
Start: 2024-10-17

## 2024-10-25 LAB
CV ZIO BASELINE AVG BPM: 74 BPM
CV ZIO BASELINE BPM HIGH: 190 BPM
CV ZIO BASELINE BPM LOW: 30 BPM
CV ZIO DEVICE ANALYSIS TIME: NORMAL
CV ZIO ECT SVE COUNT: 202 EPISODES
CV ZIO ECT SVE CPLT COUNT: 14 EPISODES
CV ZIO ECT SVE CPLT FREQ: NORMAL
CV ZIO ECT SVE FREQ: NORMAL
CV ZIO ECT SVE TPLT COUNT: 1 EPISODES
CV ZIO ECT SVE TPLT FREQ: NORMAL
CV ZIO ECT VE COUNT: 471 EPISODES
CV ZIO ECT VE CPLT COUNT: 0 EPISODES
CV ZIO ECT VE CPLT FREQ: 0
CV ZIO ECT VE FREQ: NORMAL
CV ZIO ECT VE TPLT COUNT: 0 EPISODES
CV ZIO ECT VE TPLT FREQ: 0
CV ZIO ECTOPIC SVE COUPLET RAW PERCENT: 0 %
CV ZIO ECTOPIC SVE ISOLATED PERCENT: 0.03 %
CV ZIO ECTOPIC SVE TRIPLET RAW PERCENT: 0 %
CV ZIO ECTOPIC VE COUPLET RAW PERCENT: 0 %
CV ZIO ECTOPIC VE ISOLATED PERCENT: 0.06 %
CV ZIO ECTOPIC VE TRIPLET RAW PERCENT: 0 %
CV ZIO ENROLLMENT END: NORMAL
CV ZIO ENROLLMENT START: NORMAL
CV ZIO PATIENT EVENTS DIARIES: 1
CV ZIO PATIENT EVENTS TRIGGERS: 2
CV ZIO PAUSE COUNT: 0
CV ZIO PRESCRIPTION STATUS: NORMAL
CV ZIO SVT AVG BPM: 130 BPM
CV ZIO SVT BPM HIGH: 190 BPM
CV ZIO SVT BPM LOW: 90 BPM
CV ZIO SVT COUNT: 6
CV ZIO SVT F EPI AVG BPM: 178 BPM
CV ZIO SVT F EPI BEATS: 10 BEATS
CV ZIO SVT F EPI BPM HIGH: 190 BPM
CV ZIO SVT F EPI BPM LOW: 136 BPM
CV ZIO SVT F EPI DUR: 3.2 SEC
CV ZIO SVT F EPI END: NORMAL
CV ZIO SVT F EPI START: NORMAL
CV ZIO SVT L EPI AVG BPM: 104 BPM
CV ZIO SVT L EPI BEATS: 6 BEATS
CV ZIO SVT L EPI BPM HIGH: 128 BPM
CV ZIO SVT L EPI BPM LOW: 92 BPM
CV ZIO SVT L EPI DUR: 3.8 SEC
CV ZIO SVT L EPI END: NORMAL
CV ZIO SVT L EPI START: NORMAL
CV ZIO TOTAL  ENROLLMENT PERIOD: NORMAL
CV ZIO VT COUNT: 0

## 2024-12-16 RX ORDER — ISOSORBIDE MONONITRATE 30 MG/1
30 TABLET, EXTENDED RELEASE ORAL DAILY
Qty: 90 TABLET | Refills: 0 | Status: SHIPPED | OUTPATIENT
Start: 2024-12-16

## 2025-01-11 DIAGNOSIS — E11.9 TYPE 2 DIABETES MELLITUS WITHOUT COMPLICATION, WITHOUT LONG-TERM CURRENT USE OF INSULIN: ICD-10-CM

## 2025-01-15 ENCOUNTER — OFFICE VISIT (OUTPATIENT)
Dept: FAMILY MEDICINE CLINIC | Facility: CLINIC | Age: 70
End: 2025-01-15
Payer: MEDICARE

## 2025-01-15 VITALS
WEIGHT: 156.4 LBS | BODY MASS INDEX: 26.06 KG/M2 | HEART RATE: 69 BPM | OXYGEN SATURATION: 97 % | SYSTOLIC BLOOD PRESSURE: 140 MMHG | HEIGHT: 65 IN | DIASTOLIC BLOOD PRESSURE: 74 MMHG

## 2025-01-15 DIAGNOSIS — E11.65 TYPE 2 DIABETES MELLITUS WITH HYPERGLYCEMIA, WITHOUT LONG-TERM CURRENT USE OF INSULIN: Primary | ICD-10-CM

## 2025-01-15 DIAGNOSIS — Z87.891 PERSONAL HISTORY OF TOBACCO USE, PRESENTING HAZARDS TO HEALTH: ICD-10-CM

## 2025-01-15 DIAGNOSIS — E78.2 MIXED HYPERLIPIDEMIA: ICD-10-CM

## 2025-01-15 DIAGNOSIS — Z87.891 HISTORY OF NICOTINE DEPENDENCE: ICD-10-CM

## 2025-01-15 DIAGNOSIS — Z00.00 ENCOUNTER FOR SCREENING AND PREVENTATIVE CARE: ICD-10-CM

## 2025-01-15 DIAGNOSIS — H81.09 MENIERE'S DISEASE, UNSPECIFIED LATERALITY: ICD-10-CM

## 2025-01-15 RX ORDER — MECLIZINE HYDROCHLORIDE 25 MG/1
25 TABLET ORAL 3 TIMES DAILY PRN
Qty: 45 TABLET | Refills: 0 | Status: SHIPPED | OUTPATIENT
Start: 2025-01-15

## 2025-01-15 RX ORDER — ISOSORBIDE MONONITRATE 60 MG/1
1 TABLET, EXTENDED RELEASE ORAL DAILY
COMMUNITY
Start: 2024-09-20

## 2025-01-15 RX ORDER — ZOSTER VACCINE RECOMBINANT, ADJUVANTED 50 MCG/0.5
0.5 KIT INTRAMUSCULAR ONCE
Qty: 1 EACH | Refills: 1 | Status: SHIPPED | OUTPATIENT
Start: 2025-01-15 | End: 2025-01-15

## 2025-01-15 NOTE — PROGRESS NOTES
Subjective   Gerda Bolden is a 69 y.o. female. Presents today for   Chief Complaint   Patient presents with    Dizziness     Worse with Lying down at night  and turning head a certain way       History Of Present Illness Gerda Bolden is a 69-year-old female presenting today with complaint of vertigo    Care gaps:  Zoster  COVID  Influenza  Hepatitis B    Diabetic eye exam she will schedule  lung cancer screening  Hemoglobin [id number]  DEXA scan    History of Present Illness  The patient presents for evaluation of dizziness.    She reports experiencing persistent dizziness, particularly severe during sleep, a symptom that has been ongoing since the previous year. Despite undergoing testing for vertigo and receiving physical therapy, her symptoms have not improved. Over the past month, she has noticed an exacerbation of her dizziness when assuming a supine position or turning her head in specific directions. The severity of her dizziness occasionally leads to near syncope. She also reports frequent episodes of imbalance, even while standing still, necessitating support from others. These episodes are not accompanied by dizziness. Her family suspects a possible diagnosis of Meniere's disease. She admits to inadequate hydration, consuming only one bottle of water during the day and another at night. Her fluid intake is primarily composed of tea, which she consumes with her evening meal, and occasional sodas during nighttime snacks. She has not had an eye examination this year. She has previously attempted the Epley maneuver under the guidance of her , but this resulted in a syncopal episode. She was prescribed meclizine, which she took intermittently due to the episodic nature of her dizziness, but found it ineffective.    She expresses interest in receiving the shingles vaccine, motivated by her sister's severe experience with the disease.    She also requests an examination of her ears due to a sensation of  blockage, which she suspects may be related to her hearing aids.    She is due for a lung cancer screening. She quit smoking about 7 to 8 years ago. She used to smoke about a pack a day for 47 years.    She is due to have her hemoglobin A1c drawn.    Supplemental Information  She wore a Holter monitor 3 months ago, and her cardiologist said everything was fine.    SOCIAL HISTORY  She quit smoking about 7 to 8 years ago. She used to smoke about a pack a day for 47 years.    FAMILY HISTORY  Her sister had a severe case of shingles that required hospitalization.    MEDICATIONS  Meclizine    Patient Active Problem List   Diagnosis    Abnormal CT scan of heart    Abnormal levels of other serum enzymes    Adhesive capsulitis of left shoulder    Anxiety    Arteriosclerosis of coronary artery    Benign essential hypertension    Bilateral hearing loss    Bradycardia    Chest pain due to myocardial ischemia    Chest pain    Ischemic chest pain    Chronic obstructive pulmonary disease    Nausea    Diabetes mellitus    Disequilibrium    Dyslipidemia    Ex-smoker    Fatigue    Hiatal hernia    Hyperlipidemia    Thyroid disease    Immunization due    Increased glucose level    Melanocytic nevus    Mixed anxiety and depressive disorder    High blood pressure    Atrial fibrillation    Postural hypotension    Breast cancer screening    Encounter for follow-up examination after completed treatment for conditions other than malignant neoplasm    Preop cardiovascular exam    Presence of coronary angioplasty implant and graft    Rectal hemorrhage    Shoulder pain    Skin lesion    Syncope    Thickened endometrium    Tobacco user    Unstable angina    Vitamin B12 deficiency    Vitamin D deficiency    Peña's esophagus    Dvrtclos of lg int w/o perforation or abscess w/o bleeding    Early satiety    Gastro-esophageal reflux disease with esophagitis    Hemochromatosis, unspecified    Long term (current) use of anticoagulants     Nonalcoholic steatohepatitis (BELLAMY)    Polyp of colon    Personal history of colonic polyps    Dysphagia    RUQ pain       Social History     Socioeconomic History    Marital status:    Tobacco Use    Smoking status: Former     Current packs/day: 0.00     Average packs/day: 1.5 packs/day for 46.0 years (69.0 ttl pk-yrs)     Types: Cigarettes     Start date: 1971     Quit date: 2017     Years since quittin.0    Smokeless tobacco: Never   Vaping Use    Vaping status: Never Used   Substance and Sexual Activity    Alcohol use: Never    Drug use: Never    Sexual activity: Defer       Allergies   Allergen Reactions    Ezetimibe Unknown - Low Severity    Statins Other (See Comments)     Muscle aches       Current Outpatient Medications on File Prior to Visit   Medication Sig Dispense Refill    Alirocumab (Praluent) 75 MG/ML solution auto-injector Inject 1 mL under the skin into the appropriate area as directed Every 14 (Fourteen) Days. 6 mL 3    amitriptyline (ELAVIL) 10 MG tablet Take 1 tablet by mouth Daily.      amLODIPine (NORVASC) 5 MG tablet Take 0.5 tablets by mouth Daily. (Patient taking differently: Take 1 tablet by mouth Daily.) 45 tablet 3    aspirin 81 MG EC tablet Take 1 tablet by mouth Daily.      Calcium Carbonate Antacid (CALCIUM CARBONATE PO) tablet      Cholecalciferol (Vitamin D3) 50 MCG (2000 UT) capsule Take 1 capsule by mouth Daily. 90 capsule 0    dexlansoprazole (DEXILANT) 60 MG capsule Take 1 capsule by mouth Daily.      isosorbide mononitrate (IMDUR) 60 MG 24 hr tablet Take 1 tablet by mouth Daily.      levothyroxine (SYNTHROID, LEVOTHROID) 75 MCG tablet TAKE 1 TABLET BY MOUTH DAILY 30 tablet 5    metFORMIN (GLUCOPHAGE) 500 MG tablet TAKE 1 TABLET BY MOUTH TWICE DAILY WITH MEALS 180 tablet 3    nitroglycerin (NITROSTAT) 0.4 MG SL tablet       Vitamin E 180 MG (400 UNIT) capsule capsule Take 1 capsule by mouth Daily. 90 capsule 1    [DISCONTINUED] meclizine (ANTIVERT) 25 MG tablet  "TAKE 1 TABLET BY MOUTH THREE TIMES DAILY AS NEEDED FOR DIZZINESS 90 tablet 0    [DISCONTINUED] cephalexin (KEFLEX) 500 MG capsule Take 1 capsule by mouth 2 (Two) Times a Day. 14 capsule 0    [DISCONTINUED] isosorbide mononitrate (IMDUR) 30 MG 24 hr tablet Take 1 tablet by mouth Daily. 90 tablet 0     No current facility-administered medications on file prior to visit.       Objective   Vitals:    01/15/25 1405   BP: 140/74   Pulse: 69   SpO2: 97%   Weight: 70.9 kg (156 lb 6.4 oz)   Height: 165.1 cm (65\")     Body mass index is 26.03 kg/m².    Physical Exam  There is a little bit of clear fluid behind one ear.  Physical Exam  Constitutional:       Appearance: She is obese.   HENT:      Head: Normocephalic and atraumatic.      Right Ear: Tympanic membrane, ear canal and external ear normal.      Left Ear: Ear canal and external ear normal.      Mouth/Throat:      Mouth: Mucous membranes are moist.   Eyes:      Pupils: Pupils are equal, round, and reactive to light.   Cardiovascular:      Rate and Rhythm: Normal rate and regular rhythm.      Pulses: Normal pulses.      Heart sounds: Normal heart sounds.   Pulmonary:      Effort: Pulmonary effort is normal.      Breath sounds: Normal breath sounds.   Musculoskeletal:         General: Normal range of motion.   Skin:     General: Skin is warm and dry.      Capillary Refill: Capillary refill takes less than 2 seconds.   Neurological:      General: No focal deficit present.      Mental Status: She is alert.   Psychiatric:         Mood and Affect: Mood normal.     Results         Procedures     Assessment & Plan   Diagnoses and all orders for this visit:    1. Type 2 diabetes mellitus with hyperglycemia, without long-term current use of insulin (Primary)  -     Hemoglobin A1c  -     CBC & Differential  -     Basic Metabolic Panel    2. Mixed hyperlipidemia  -     Lipid Panel    3. Meniere's disease, unspecified laterality  -     meclizine (ANTIVERT) 25 MG tablet; Take 1 " tablet by mouth 3 (Three) Times a Day As Needed for Dizziness.  Dispense: 45 tablet; Refill: 0    4. Personal history of tobacco use, presenting hazards to health    5. History of nicotine dependence  Comments:  she quit 8 years ago. She smoked 1 PPD. for 47 years = 47 PY    6. Encounter for screening and preventative care  -     Zoster Vac Recomb Adjuvanted (Shingrix) 50 MCG/0.5ML reconstituted suspension; Inject 0.5 mL into the appropriate muscle as directed by prescriber 1 (One) Time for 1 dose. Then repeat in 2 to 6 months  Dispense: 1 each; Refill: 1         Assessment & Plan  1. Dizziness.  The dizziness could be attributed to inadequate hydration, leading to reduced blood volume and subsequent difficulty in maintaining blood pressure. This, in turn, could result in persistent dizziness throughout the day. The diuretic effect of tea and soda may also be contributing to the symptoms. The possibility of Meniere's disease was discussed, given the patient's symptomatology. She was advised to increase her water intake to see if it helps alleviate the dizziness. A prescription for meclizine was provided, to be taken as needed during episodes of dizziness. [facility] prescription [facility] to [facility].    2. Health Maintenance.  She is due for a lung cancer screening, which will be scheduled. She is also due for her hemoglobin A1c test, which will be conducted today. She expressed interest in receiving the shingles vaccine, which will be administered at [facility].    3. Ear discomfort.  She reported feeling that her ears were stopped up, possibly due to her hearing aids. Examination revealed a small amount of clear fluid behind one ear, likely related to the use of hearing aids.                 No follow-ups on file.     Patient or patient representative verbalized consent for the use of Ambient Listening during the visit with  HARLEEN Childers for chart documentation. 1/15/2025  15:53 EST      Low-Dose Lung  "Cancer CT Screening Visit    CHIEF COMPLAINT:    Shared Decision Making  I am discussing tobacco cessation today with Gerda Bolden    SMOKING HISTORY:     Social History     Tobacco Use   Smoking Status Former    Current packs/day: 0.00    Average packs/day: 1.5 packs/day for 46.0 years (69.0 ttl pk-yrs)    Types: Cigarettes    Start date: 1971    Quit date: 2017    Years since quittin.0   Smokeless Tobacco Never       SUBJECTIVE:     Gerda Bolden is a former smoker quitting 8 years ago with a  47  pack year history.  she reports no use of alternate forms of tobacco, electronic cigarettes, marijuana or other substances.  Based on the recommendation of the [place] Preventive Services Task Force, this patient is at high risk for lung cancer and a low-dose CT screening scan is recommended.     The patient has had no hemoptysis, unintentional weight loss or increasing shortness of breath. The patient is asymptomatic and has no signs or symptoms of lung cancer.     Together we discussed the potential benefits and potential harms of being screened for lung cancer including the potential for follow up diagnostic testing, risk for over diagnosis, false positive rate and radiation exposure using the [facility] Lung Cancer Screening Shared Decision-Making Tool. A copy of this decision aid resource has been provided in the after visit summary.  We also reviewed the patient's smoking history and counseled her on the importance and health benefits of maintaining cigarette smoking abstinence.      OBJECTIVE:    /74   Pulse 69   Ht 165.1 cm (65\")   Wt 70.9 kg (156 lb 6.4 oz)   SpO2 97%   BMI 26.03 kg/m²   General: no distress, alert and oriented  Chest: Lung sounds are clear to auscultation, no wheezes, rales or rhonchi, with symmetric air entry. No respiratory distress  Cardiovascular: RRR with no murmur auscultated        Recommendations for continued lung cancer screening:      We discussed the NCCN " guidelines for lung cancer screening and the patient verbalized understanding that annual screening is recommended until fifteen years beyond smoking as long as they have no other disease or comorbidity that would prevent them from receiving cancer treatments such as surgery should a lung cancer be detected.  After review of the NCCN guidelines and recommendations for ongoing screening, the patient verbalized understanding of recommendations for follow-up.  The patient has decided to proceed with a Low Dose Lung Cancer Screening CT today.       10minutes face-to-face spent counseling patient on the continued health benefits of having quit tobacco, maintaining smoking abstinence, smoking cessation strategies and resources, as well as the importance of adherence to annual lung cancer low-dose CT screening.

## 2025-01-16 LAB
BASOPHILS # BLD AUTO: 0 X10E3/UL (ref 0–0.2)
BASOPHILS NFR BLD AUTO: 1 %
BUN SERPL-MCNC: 13 MG/DL (ref 8–27)
BUN/CREAT SERPL: 15 (ref 12–28)
CALCIUM SERPL-MCNC: 10.3 MG/DL (ref 8.7–10.3)
CHLORIDE SERPL-SCNC: 100 MMOL/L (ref 96–106)
CHOLEST SERPL-MCNC: 183 MG/DL (ref 100–199)
CO2 SERPL-SCNC: 27 MMOL/L (ref 20–29)
CREAT SERPL-MCNC: 0.88 MG/DL (ref 0.57–1)
EGFRCR SERPLBLD CKD-EPI 2021: 71 ML/MIN/1.73
EOSINOPHIL # BLD AUTO: 0.1 X10E3/UL (ref 0–0.4)
EOSINOPHIL NFR BLD AUTO: 2 %
ERYTHROCYTE [DISTWIDTH] IN BLOOD BY AUTOMATED COUNT: 13.1 % (ref 11.7–15.4)
GLUCOSE SERPL-MCNC: 118 MG/DL (ref 70–99)
HBA1C MFR BLD: 6.8 % (ref 4.8–5.6)
HCT VFR BLD AUTO: 41.6 % (ref 34–46.6)
HDLC SERPL-MCNC: 54 MG/DL
HGB BLD-MCNC: 13.6 G/DL (ref 11.1–15.9)
IMM GRANULOCYTES # BLD AUTO: 0.1 X10E3/UL (ref 0–0.1)
IMM GRANULOCYTES NFR BLD AUTO: 1 %
LDLC SERPL CALC-MCNC: 100 MG/DL (ref 0–99)
LYMPHOCYTES # BLD AUTO: 2.3 X10E3/UL (ref 0.7–3.1)
LYMPHOCYTES NFR BLD AUTO: 34 %
MCH RBC QN AUTO: 28.7 PG (ref 26.6–33)
MCHC RBC AUTO-ENTMCNC: 32.7 G/DL (ref 31.5–35.7)
MCV RBC AUTO: 88 FL (ref 79–97)
MONOCYTES # BLD AUTO: 0.5 X10E3/UL (ref 0.1–0.9)
MONOCYTES NFR BLD AUTO: 8 %
NEUTROPHILS # BLD AUTO: 3.8 X10E3/UL (ref 1.4–7)
NEUTROPHILS NFR BLD AUTO: 54 %
PLATELET # BLD AUTO: 240 X10E3/UL (ref 150–450)
POTASSIUM SERPL-SCNC: 4.4 MMOL/L (ref 3.5–5.2)
RBC # BLD AUTO: 4.74 X10E6/UL (ref 3.77–5.28)
SODIUM SERPL-SCNC: 141 MMOL/L (ref 134–144)
TRIGL SERPL-MCNC: 165 MG/DL (ref 0–149)
VLDLC SERPL CALC-MCNC: 29 MG/DL (ref 5–40)
WBC # BLD AUTO: 6.8 X10E3/UL (ref 3.4–10.8)

## 2025-01-17 NOTE — PROGRESS NOTES
Gerda your hemoglobin A1c has improved by 0.1 points it is decreased from 6.9 to to 6.8 keep up the good work.  Your fasting glucose has improved from 148 down to 118 again keep up the good work.  Your triglycerides have remained the same at 165 but your LDL cholesterol has not increased from  which is not a good thing, try to work in a little bit of exercise 5 days a week that can be as simple as walking and this should help.

## 2025-02-03 DIAGNOSIS — I10 PRIMARY HYPERTENSION: ICD-10-CM

## 2025-02-03 RX ORDER — AMLODIPINE BESYLATE 5 MG/1
2.5 TABLET ORAL DAILY
Qty: 45 TABLET | Refills: 3 | Status: SHIPPED | OUTPATIENT
Start: 2025-02-03

## 2025-02-12 ENCOUNTER — TELEPHONE (OUTPATIENT)
Dept: FAMILY MEDICINE CLINIC | Facility: CLINIC | Age: 70
End: 2025-02-12

## 2025-02-12 DIAGNOSIS — I10 PRIMARY HYPERTENSION: ICD-10-CM

## 2025-02-12 RX ORDER — AMLODIPINE BESYLATE 5 MG/1
5 TABLET ORAL DAILY
Qty: 90 TABLET | Refills: 3 | Status: SHIPPED | OUTPATIENT
Start: 2025-02-12

## 2025-02-12 NOTE — TELEPHONE ENCOUNTER
Caller: Gerda Bolden    Relationship: Self    Best call back number: [phone]    What medication are you requesting: amLODIPine (NORVASC) 5 MG table     If a prescription is needed, what is your preferred pharmacy and phone number: [id number] - [place], [place] - [address] AT [facility] - [phone] [facility] [phone] FX     Additional notes: PATIENT CALLING STATING THAT HER CARDIOLOGIST INCREASED THE AMLODIPINE 5 MG, FROM ONE-HALF TABLET DAILY, TO 1 TABLET DAILY. THE PATIENT STATES THAT SHE MADE DR. BOND AWARE OF THE DOSE INCREASE AT HER LAST OFFICE VISIT, AND THE PATIENT IS TAKING 1 TABLET A DAY FROM THE AMLODIPINE 5 MG TABLETS THAT SHE HAS ON-HAND, AND IS ABOUT TO RUN OUT OF THIS MEDICATION, AND THE PHARMACY WILL NOT REFILL THE MEDICATION, FOR THE REASON THAT IT'S TOO EARLY TO REFILL. THE PATIENT STATES THAT THE AMLODIPINE 5 MG, PRESCRIPTION THE PHARMACY HAS ON FILE STILL HAS DIRECTIONS FOR  THE PATIENT TO TAKE ONE-HALF TABLET DAILY, AND NOT THE DOSE INCREASE TO 1 TABLET DAILY. THE PATIENT IS REQUESTING A NEW PRESCRIPTION TO BE SENT TO THE PHARMACY REFLECTING THE DOSE INCREASE.

## 2025-03-04 ENCOUNTER — OFFICE VISIT (OUTPATIENT)
Dept: FAMILY MEDICINE CLINIC | Facility: CLINIC | Age: 70
End: 2025-03-04
Payer: MEDICARE

## 2025-03-04 DIAGNOSIS — R42 DIZZINESS: ICD-10-CM

## 2025-03-04 DIAGNOSIS — I95.1 ORTHOSTATIC HYPOTENSION: ICD-10-CM

## 2025-03-04 DIAGNOSIS — I10 PRIMARY HYPERTENSION: Primary | ICD-10-CM

## 2025-03-04 RX ORDER — ISOSORBIDE MONONITRATE 60 MG/1
60 TABLET, EXTENDED RELEASE ORAL DAILY
Start: 2025-03-04 | End: 2025-03-10 | Stop reason: SDUPTHER

## 2025-03-04 RX ORDER — AMLODIPINE BESYLATE 2.5 MG/1
2.5 TABLET ORAL 2 TIMES DAILY
Qty: 60 TABLET | Refills: 5 | Status: SHIPPED | OUTPATIENT
Start: 2025-03-04

## 2025-03-04 NOTE — PROGRESS NOTES
Subjective   Gerda Bolden is a 69 y.o. female. Presents today for   Chief Complaint   Patient presents with    Dizziness     Not any better    Ear Fullness    Hypertension             Symptoms are: recurrent.   Onset was 1 to 6 months.   Symptoms occur: daily.  Pertinent negative symptoms include no abdominal pain, no anorexia, no joint pain, no change in stool, no chest pain, no chills, no congestion, no cough, no diaphoresis, no fatigue, no fever, no headaches, no joint swelling, no myalgias, no nausea, no neck pain, no numbness, no rash, no sore throat, no swollen glands, no dysuria, no vertigo, no visual change, no vomiting and no weakness.   Other symptom:  Dizziness  Aggravating factors include bending, twisting and standing (rolling over in bed, turnign head at times).   Treatments tried: tried cutting bp meds, but was increased again as bp too high;  She has not had her bp meds today;  She doesn't think helped;   Has tried exercises but no relief.   Treatment and/or Medications comments include: Meclizine       Review of Systems   Constitutional:  Negative for chills, diaphoresis, fatigue and fever.   HENT:  Positive for hearing loss (wears hearing aides, denies hx of menieres). Negative for congestion, ear pain (feel full) and sore throat.    Respiratory:  Negative for cough.    Cardiovascular:  Negative for chest pain.   Gastrointestinal:  Negative for abdominal pain, anorexia, nausea and vomiting.   Genitourinary:  Negative for dysuria.   Musculoskeletal:  Negative for joint pain, myalgias and neck pain.   Skin:  Negative for rash.   Neurological:  Positive for dizziness and light-headedness. Negative for vertigo, syncope, facial asymmetry, speech difficulty, weakness, numbness and headaches.       Patient Active Problem List   Diagnosis    Abnormal CT scan of heart    Abnormal levels of other serum enzymes    Adhesive capsulitis of left shoulder    Anxiety    Arteriosclerosis of coronary artery    Benign  essential hypertension    Bilateral hearing loss    Bradycardia    Chest pain due to myocardial ischemia    Chest pain    Ischemic chest pain    Chronic obstructive pulmonary disease    Nausea    Diabetes mellitus    Disequilibrium    Dyslipidemia    Ex-smoker    Fatigue    Hiatal hernia    Hyperlipidemia    Thyroid disease    Immunization due    Increased glucose level    Melanocytic nevus    Mixed anxiety and depressive disorder    High blood pressure    Atrial fibrillation    Postural hypotension    Breast cancer screening    Encounter for follow-up examination after completed treatment for conditions other than malignant neoplasm    Preop cardiovascular exam    Presence of coronary angioplasty implant and graft    Rectal hemorrhage    Shoulder pain    Skin lesion    Syncope    Thickened endometrium    Tobacco user    Unstable angina    Vitamin B12 deficiency    Vitamin D deficiency    Peña's esophagus    Dvrtclos of lg int w/o perforation or abscess w/o bleeding    Early satiety    Gastro-esophageal reflux disease with esophagitis    Hemochromatosis, unspecified    Long term (current) use of anticoagulants    Nonalcoholic steatohepatitis (BELLAMY)    Polyp of colon    Personal history of colonic polyps    Dysphagia    RUQ pain       Social History     Socioeconomic History    Marital status:    Tobacco Use    Smoking status: Former     Current packs/day: 0.00     Average packs/day: 1.5 packs/day for 46.0 years (69.0 ttl pk-yrs)     Types: Cigarettes     Start date: 1971     Quit date: 2017     Years since quittin.1    Smokeless tobacco: Never   Vaping Use    Vaping status: Never Used   Substance and Sexual Activity    Alcohol use: Never    Drug use: Never    Sexual activity: Not Currently     Partners: Male       Allergies   Allergen Reactions    Ezetimibe Unknown - Low Severity    Statins Other (See Comments)     Muscle aches         Objective   Vitals:    25 1416 25 1953 25  1954 03/1955   BP: 142/72  Comment: Not had bp meds today      SpO2:  97% 99% 98%   Weight:       Height:         Body mass index is 26.39 kg/m².    Physical Exam  Vitals and nursing note reviewed.   Constitutional:       Appearance: She is well-developed.   HENT:      Head: Normocephalic and atraumatic.      Right Ear: Tympanic membrane normal. Decreased hearing noted.      Left Ear: Tympanic membrane normal. Decreased hearing noted.      Ears:      Comments: Hearing aides in place  Neck:      Thyroid: No thyromegaly.      Vascular: No JVD.   Cardiovascular:      Rate and Rhythm: Normal rate and regular rhythm.      Heart sounds: Normal heart sounds. No murmur heard.     No friction rub. No gallop.   Pulmonary:      Effort: Pulmonary effort is normal. No respiratory distress.      Breath sounds: Normal breath sounds. No wheezing or rales.   Abdominal:      General: Bowel sounds are normal. There is no distension.      Palpations: Abdomen is soft.      Tenderness: There is no abdominal tenderness. There is no guarding or rebound.   Musculoskeletal:      Cervical back: Neck supple.   Skin:     General: Skin is warm and dry.   Neurological:      Mental Status: She is alert.      Cranial Nerves: No cranial nerve deficit or facial asymmetry.      Motor: No weakness, abnormal muscle tone or pronator drift.      Coordination: Coordination normal. Finger-Nose-Finger Test normal.      Gait: Gait normal.      Deep Tendon Reflexes: Reflexes normal.      Comments: Barbra hunt notes dizziness, but no horizontal nystagmus noted   Psychiatric:         Behavior: Behavior normal.       ECG 12 Lead  - hypertension, dizziness    Date/Time: 3/4/2025 12:59 PM  Performed by: Siddharth Lamas DO    Authorized by: Siddharth Lamas DO  Comparison: not compared with previous ECG   Previous ECG: no previous ECG available  Rhythm: sinus bradycardia  Rate: bradycardic  BPM: 57  Conduction: incomplete right bundle branch  block  Conduction comments: QRS 89 ms;  rsr'  lead V1  QTc 394 ms  ST Segments: ST segments normal  T flattening: III  Clinical impression comment: 1) sinus bradycardia            Results       Assessment & Plan   Diagnoses and all orders for this visit:    1. Primary hypertension (Primary)  -     amLODIPine (NORVASC) 2.5 MG tablet; Take 1 tablet by mouth 2 (Two) Times a Day.  Dispense: 60 tablet; Refill: 5  -     ECG 12 Lead    2. Orthostatic hypotension  -     ECG 12 Lead    3. Dizziness  -     ECG 12 Lead    Other orders  -     isosorbide mononitrate (IMDUR) 60 MG 24 hr tablet; Take 1 tablet by mouth Daily.    Patient mixed history, but has +orthostatics on exam today and has not had bp meds today;  Will change amloidpien to 2.5mg bid and see if splitting dose helps symptoms as bp running high;  Take time when gettign up  She reports dizziness with rolling over in bed and quick head movements, but describes feeling light headed not vertigo;  Had dizziness on candy hallpike though no nystagmus, will have try Epley manuevers see if helps;    Relates ears feel full, though TM look intact and I don't appreciate an effusion;  consider sending to ENT     Assessment & Plan            -Follow up: 2 to 3months andprn     ________________________________________  Siddharth Lamas DO, MS    Current Outpatient Medications on File Prior to Visit   Medication Sig Dispense Refill    Alirocumab (Praluent) 75 MG/ML solution auto-injector Inject 1 mL under the skin into the appropriate area as directed Every 14 (Fourteen) Days. 6 mL 3    amitriptyline (ELAVIL) 10 MG tablet Take 1 tablet by mouth Daily.      aspirin 81 MG EC tablet Take 1 tablet by mouth Daily.      Calcium Carbonate Antacid (CALCIUM CARBONATE PO) tablet      Cholecalciferol (Vitamin D3) 50 MCG (2000 UT) capsule Take 1 capsule by mouth Daily. 90 capsule 0    dexlansoprazole (DEXILANT) 60 MG capsule Take 1 capsule by mouth Daily.      levothyroxine (SYNTHROID,  LEVOTHROID) 75 MCG tablet TAKE 1 TABLET BY MOUTH DAILY 30 tablet 5    metFORMIN (GLUCOPHAGE) 500 MG tablet TAKE 1 TABLET BY MOUTH TWICE DAILY WITH MEALS 180 tablet 3    nitroglycerin (NITROSTAT) 0.4 MG SL tablet       Vitamin E 180 MG (400 UNIT) capsule capsule Take 1 capsule by mouth Daily. 90 capsule 1     No current facility-administered medications on file prior to visit.

## 2025-03-09 VITALS
BODY MASS INDEX: 26.42 KG/M2 | HEIGHT: 65 IN | SYSTOLIC BLOOD PRESSURE: 142 MMHG | DIASTOLIC BLOOD PRESSURE: 72 MMHG | WEIGHT: 158.6 LBS | OXYGEN SATURATION: 98 %

## 2025-03-10 RX ORDER — ISOSORBIDE MONONITRATE 60 MG/1
60 TABLET, EXTENDED RELEASE ORAL DAILY
Qty: 90 TABLET | Refills: 0 | Status: SHIPPED | OUTPATIENT
Start: 2025-03-10

## 2025-04-17 ENCOUNTER — OFFICE VISIT (OUTPATIENT)
Dept: FAMILY MEDICINE CLINIC | Facility: CLINIC | Age: 70
End: 2025-04-17
Payer: MEDICARE

## 2025-04-17 DIAGNOSIS — E78.2 MIXED HYPERLIPIDEMIA: ICD-10-CM

## 2025-04-17 DIAGNOSIS — I10 PRIMARY HYPERTENSION: ICD-10-CM

## 2025-04-17 DIAGNOSIS — R42 DIZZINESS: ICD-10-CM

## 2025-04-17 DIAGNOSIS — K22.70 BARRETT'S ESOPHAGUS WITHOUT DYSPLASIA: ICD-10-CM

## 2025-04-17 DIAGNOSIS — E11.65 TYPE 2 DIABETES MELLITUS WITH HYPERGLYCEMIA, WITHOUT LONG-TERM CURRENT USE OF INSULIN: Primary | ICD-10-CM

## 2025-04-17 DIAGNOSIS — I95.1 ORTHOSTATIC HYPOTENSION: ICD-10-CM

## 2025-04-17 DIAGNOSIS — I25.10 ARTERIOSCLEROSIS OF CORONARY ARTERY: ICD-10-CM

## 2025-04-17 RX ORDER — DEXLANSOPRAZOLE 60 MG/1
60 CAPSULE, DELAYED RELEASE ORAL DAILY
Qty: 90 CAPSULE | Refills: 3 | Status: SHIPPED | OUTPATIENT
Start: 2025-04-17

## 2025-04-17 RX ORDER — AMLODIPINE BESYLATE 2.5 MG/1
7.5 TABLET ORAL NIGHTLY
Start: 2025-04-17

## 2025-04-17 RX ORDER — METFORMIN HYDROCHLORIDE 500 MG/1
500 TABLET, EXTENDED RELEASE ORAL DAILY
Qty: 90 TABLET | Refills: 3 | Status: SHIPPED | OUTPATIENT
Start: 2025-04-17

## 2025-04-17 RX ORDER — ALIROCUMAB 75 MG/ML
75 INJECTION, SOLUTION SUBCUTANEOUS
Qty: 6 ML | Refills: 3 | Status: SHIPPED | OUTPATIENT
Start: 2025-04-17

## 2025-04-17 RX ORDER — FAMOTIDINE 40 MG/1
40 TABLET, FILM COATED ORAL NIGHTLY
Qty: 90 TABLET | Refills: 3 | Status: SHIPPED | OUTPATIENT
Start: 2025-04-17

## 2025-04-17 NOTE — PROGRESS NOTES
Subjective   Gerda Bolden is a 69 y.o. female. Presents today for   Chief Complaint   Patient presents with   • Diabetes   • Hyperlipidemia   • Hypertension   • Hypothyroidism         History of Present Illness  Patient 59 y/o with dm2, hld, htn, hypothyrodism and barretts esophagitis here for f/u;  had labs January;  no cp/soa; no abd pain; needs refills.  Heartburn doing ok  History of Present Illness    Review of Systems   Respiratory:  Negative for shortness of breath.    Cardiovascular:  Negative for chest pain and palpitations.   Gastrointestinal:  Negative for abdominal pain.   Neurological:  Positive for dizziness and light-headedness. Negative for syncope.       Patient Active Problem List   Diagnosis   • Abnormal CT scan of heart   • Abnormal levels of other serum enzymes   • Adhesive capsulitis of left shoulder   • Anxiety   • Arteriosclerosis of coronary artery   • Benign essential hypertension   • Bilateral hearing loss   • Bradycardia   • Chest pain due to myocardial ischemia   • Chest pain   • Ischemic chest pain   • Chronic obstructive pulmonary disease   • Nausea   • Diabetes mellitus   • Disequilibrium   • Dyslipidemia   • Ex-smoker   • Fatigue   • Hiatal hernia   • Hyperlipidemia   • Thyroid disease   • Immunization due   • Increased glucose level   • Melanocytic nevus   • Mixed anxiety and depressive disorder   • High blood pressure   • Atrial fibrillation   • Postural hypotension   • Breast cancer screening   • Encounter for follow-up examination after completed treatment for conditions other than malignant neoplasm   • Preop cardiovascular exam   • Presence of coronary angioplasty implant and graft   • Rectal hemorrhage   • Shoulder pain   • Skin lesion   • Syncope   • Thickened endometrium   • Tobacco user   • Unstable angina   • Vitamin B12 deficiency   • Vitamin D deficiency   • Peña's esophagus   • Dvrtclos of lg int w/o perforation or abscess w/o bleeding   • Early satiety   •  "Gastro-esophageal reflux disease with esophagitis   • Hemochromatosis, unspecified   • Long term (current) use of anticoagulants   • Nonalcoholic steatohepatitis (BELLAMY)   • Polyp of colon   • Personal history of colonic polyps   • Dysphagia   • RUQ pain       Social History     Socioeconomic History   • Marital status:    Tobacco Use   • Smoking status: Former     Current packs/day: 0.00     Average packs/day: 1.5 packs/day for 46.0 years (69.0 ttl pk-yrs)     Types: Cigarettes     Start date: 1971     Quit date: 2017     Years since quittin.2     Passive exposure: Past   • Smokeless tobacco: Never   Vaping Use   • Vaping status: Never Used   Substance and Sexual Activity   • Alcohol use: Never   • Drug use: Never   • Sexual activity: Not Currently     Partners: Male       Allergies   Allergen Reactions   • Ezetimibe Unknown - Low Severity   • Statins Other (See Comments)     Muscle aches         Objective   Vitals:    25 1405   BP: 148/66   Pulse: 73   SpO2: 97%   Weight: 72.1 kg (159 lb)   Height: 165.1 cm (65\")     Body mass index is 26.46 kg/m².    Physical Exam  Vitals and nursing note reviewed.   Constitutional:       Appearance: She is well-developed.   HENT:      Head: Normocephalic and atraumatic.   Neck:      Thyroid: No thyromegaly.      Vascular: No JVD.   Cardiovascular:      Rate and Rhythm: Normal rate and regular rhythm.      Heart sounds: Normal heart sounds. No murmur heard.     No friction rub. No gallop.   Pulmonary:      Effort: Pulmonary effort is normal. No respiratory distress.      Breath sounds: Normal breath sounds. No wheezing or rales.   Abdominal:      General: Bowel sounds are normal. There is no distension.      Palpations: Abdomen is soft.      Tenderness: There is no abdominal tenderness. There is no guarding or rebound.   Musculoskeletal:      Cervical back: Neck supple.   Skin:     General: Skin is warm and dry.   Neurological:      Mental Status: She is " alert.      Gait: Gait normal.   Psychiatric:         Behavior: Behavior normal.       Results  Component      Latest Ref Rng 1/15/2025   WBC      3.4 - 10.8 x10E3/uL 6.8    RBC      3.77 - 5.28 x10E6/uL 4.74    Hemoglobin      11.1 - 15.9 g/dL 13.6    Hematocrit      34.0 - 46.6 % 41.6    MCV      79 - 97 fL 88    MCH      26.6 - 33.0 pg 28.7    MCHC      31.5 - 35.7 g/dL 32.7    RDW      11.7 - 15.4 % 13.1    Platelets      150 - 450 x10E3/uL 240    Neutrophil Rel %      Not Estab. % 54    Lymphocyte Rel %      Not Estab. % 34    Monocyte Rel %      Not Estab. % 8    Eosinophil Rel %      Not Estab. % 2    Basophil Rel %      Not Estab. % 1    Neutrophils Absolute      1.4 - 7.0 x10E3/uL 3.8    Lymphocytes Absolute      0.7 - 3.1 x10E3/uL 2.3    Monocytes Absolute      0.1 - 0.9 x10E3/uL 0.5    Eosinophils Absolute      0.0 - 0.4 x10E3/uL 0.1    Basophils Absolute      0.0 - 0.2 x10E3/uL 0.0    Immature Granulocyte Rel %      Not Estab. % 1    Immature Grans, Absolute      0.0 - 0.1 x10E3/uL 0.1    Glucose      70 - 99 mg/dL 118 (H)    BUN      8 - 27 mg/dL 13    Creatinine      0.57 - 1.00 mg/dL 0.88    EGFR Result      >59 mL/min/1.73 71    BUN/Creatinine Ratio      12 - 28  15    Sodium      134 - 144 mmol/L 141    Potassium      3.5 - 5.2 mmol/L 4.4    Chloride      96 - 106 mmol/L 100    CO2      20 - 29 mmol/L 27    Calcium      8.7 - 10.3 mg/dL 10.3    Total Cholesterol      100 - 199 mg/dL 183    Triglycerides      0 - 149 mg/dL 165 (H)    HDL Cholesterol      >39 mg/dL 54    VLDL Cholesterol Daren      5 - 40 mg/dL 29    LDL Cholesterol       0 - 99 mg/dL 100 (H)    Hemoglobin A1C      4.8 - 5.6 % 6.8 (H)       Legend:  (H) High     Assessment & Plan   Diagnoses and all orders for this visit:    1. Type 2 diabetes mellitus with hyperglycemia, without long-term current use of insulin (Primary)  -     metFORMIN ER (GLUCOPHAGE-XR) 500 MG 24 hr tablet; Take 1 tablet by mouth Daily.  Dispense: 90 tablet; Refill:  3    2. Primary hypertension  -     amLODIPine (NORVASC) 2.5 MG tablet; Take 3 tablets by mouth Every Night.    3. Orthostatic hypotension    4. Dizziness    5. Mixed hyperlipidemia  -     Alirocumab (Praluent) 75 MG/ML solution auto-injector; Inject 1 mL under the skin into the appropriate area as directed Every 14 (Fourteen) Days.  Dispense: 6 mL; Refill: 3    6. Arteriosclerosis of coronary artery  -     Alirocumab (Praluent) 75 MG/ML solution auto-injector; Inject 1 mL under the skin into the appropriate area as directed Every 14 (Fourteen) Days.  Dispense: 6 mL; Refill: 3    7. Peña's esophagus without dysplasia  -     dexlansoprazole (DEXILANT) 60 MG capsule; Take 1 capsule by mouth Daily.  Dispense: 90 capsule; Refill: 3  -     famotidine (PEPCID) 40 MG tablet; Take 1 tablet by mouth Every Night.  Dispense: 90 tablet; Refill: 3      Meds for barretss as above  -hypertension - controlled, continue medications  -dm2 - continue medications, recheck labs  -hld =- praluent as directed as hx of coronary artery atherosclerosis and needs tight control  -reports orthostatic symptoms -take time getting up  Recheck A1c, CMP, lipid profile and mal/cr along with TSH next OV in July     Mammogram in September  Assessment & Plan            -Follow up: 3 months and prn     ________________________________________  Siddharth Lamas DO, MS    Current Outpatient Medications on File Prior to Visit   Medication Sig Dispense Refill   • Alirocumab (Praluent) 75 MG/ML solution auto-injector Inject 1 mL under the skin into the appropriate area as directed Every 14 (Fourteen) Days. 6 mL 3   • amitriptyline (ELAVIL) 10 MG tablet Take 1 tablet by mouth Daily.     • amLODIPine (NORVASC) 2.5 MG tablet Take 1 tablet by mouth 2 (Two) Times a Day. (Patient taking differently: Take 3 tablets by mouth Daily.) 60 tablet 5   • aspirin 81 MG EC tablet Take 1 tablet by mouth Daily.     • Calcium Carbonate Antacid (CALCIUM CARBONATE PO)  tablet     • Cholecalciferol (Vitamin D3) 50 MCG (2000 UT) capsule Take 1 capsule by mouth Daily. 90 capsule 0   • dexlansoprazole (DEXILANT) 60 MG capsule Take 1 capsule by mouth Daily.     • isosorbide mononitrate (IMDUR) 60 MG 24 hr tablet Take 1 tablet by mouth Daily. 90 tablet 0   • levothyroxine (SYNTHROID, LEVOTHROID) 75 MCG tablet TAKE 1 TABLET BY MOUTH DAILY 30 tablet 5   • metFORMIN (GLUCOPHAGE) 500 MG tablet TAKE 1 TABLET BY MOUTH TWICE DAILY WITH MEALS 180 tablet 3   • Vitamin E 180 MG (400 UNIT) capsule capsule Take 1 capsule by mouth Daily. 90 capsule 1   • nitroglycerin (NITROSTAT) 0.4 MG SL tablet  (Patient not taking: Reported on 4/17/2025)       No current facility-administered medications on file prior to visit.

## 2025-04-23 RX ORDER — LEVOTHYROXINE SODIUM 75 UG/1
75 TABLET ORAL DAILY
Qty: 30 TABLET | Refills: 5 | Status: SHIPPED | OUTPATIENT
Start: 2025-04-23

## 2025-04-30 VITALS
HEIGHT: 65 IN | DIASTOLIC BLOOD PRESSURE: 66 MMHG | HEART RATE: 73 BPM | OXYGEN SATURATION: 97 % | SYSTOLIC BLOOD PRESSURE: 130 MMHG | BODY MASS INDEX: 26.49 KG/M2 | WEIGHT: 159 LBS

## 2025-05-06 DIAGNOSIS — I10 PRIMARY HYPERTENSION: ICD-10-CM

## 2025-05-06 RX ORDER — AMLODIPINE BESYLATE 2.5 MG/1
7.5 TABLET ORAL NIGHTLY
Start: 2025-05-06 | End: 2025-05-06 | Stop reason: SDUPTHER

## 2025-05-06 RX ORDER — AMLODIPINE BESYLATE 2.5 MG/1
7.5 TABLET ORAL NIGHTLY
Qty: 270 TABLET | Refills: 1 | Status: SHIPPED | OUTPATIENT
Start: 2025-05-06 | End: 2025-05-07 | Stop reason: SDUPTHER

## 2025-05-07 ENCOUNTER — TELEPHONE (OUTPATIENT)
Dept: FAMILY MEDICINE CLINIC | Facility: CLINIC | Age: 70
End: 2025-05-07
Payer: MEDICARE

## 2025-05-07 DIAGNOSIS — I10 PRIMARY HYPERTENSION: ICD-10-CM

## 2025-05-07 RX ORDER — AMLODIPINE BESYLATE 2.5 MG/1
7.5 TABLET ORAL NIGHTLY
Qty: 90 TABLET | Refills: 0 | Status: SHIPPED | OUTPATIENT
Start: 2025-05-07

## 2025-05-07 NOTE — TELEPHONE ENCOUNTER
Caller: Gerda Bolden    Relationship: Self    Best call back number: [phone]    What medication are you requesting: amLODIPine (NORVASC) 2.5 MG tablet     If a prescription is needed, what is your preferred pharmacy and phone number: [id number] [place], [place] - [address] AT [facility] - [phone] [facility] [phone]      Additional notes: PATIENT REQUESTING EMERGENCY SUPPLY OF THIS MEDICATION TO HOLD HER OVER UNTIL SHE RECEIVES HER MAIL ORDER SUPPLY FROM [employer] IS DELIVERED. PATIENT OUT OF THIS MEDICATION.

## 2025-06-04 ENCOUNTER — OFFICE VISIT (OUTPATIENT)
Dept: FAMILY MEDICINE CLINIC | Facility: CLINIC | Age: 70
End: 2025-06-04
Payer: MEDICARE

## 2025-06-04 VITALS
OXYGEN SATURATION: 98 % | WEIGHT: 154.8 LBS | HEART RATE: 78 BPM | DIASTOLIC BLOOD PRESSURE: 76 MMHG | HEIGHT: 65 IN | BODY MASS INDEX: 25.79 KG/M2 | SYSTOLIC BLOOD PRESSURE: 138 MMHG

## 2025-06-04 DIAGNOSIS — M81.0 AGE-RELATED OSTEOPOROSIS WITHOUT CURRENT PATHOLOGICAL FRACTURE: ICD-10-CM

## 2025-06-04 DIAGNOSIS — E11.65 TYPE 2 DIABETES MELLITUS WITH HYPERGLYCEMIA, WITHOUT LONG-TERM CURRENT USE OF INSULIN: Primary | ICD-10-CM

## 2025-06-04 DIAGNOSIS — Z13.220 LIPID SCREENING: ICD-10-CM

## 2025-06-04 DIAGNOSIS — Z87.891 PERSONAL HISTORY OF TOBACCO USE, PRESENTING HAZARDS TO HEALTH: ICD-10-CM

## 2025-06-04 DIAGNOSIS — Z87.891 PERSONAL HISTORY OF NICOTINE DEPENDENCE: ICD-10-CM

## 2025-06-04 DIAGNOSIS — Z91.89 AT HIGH RISK FOR OSTEOPOROSIS: ICD-10-CM

## 2025-06-04 DIAGNOSIS — Z12.2 ENCOUNTER FOR SCREENING FOR LUNG CANCER: ICD-10-CM

## 2025-06-04 PROCEDURE — 99214 OFFICE O/P EST MOD 30 MIN: CPT | Performed by: NURSE PRACTITIONER

## 2025-06-04 RX ORDER — LANCETS
EACH MISCELLANEOUS
Qty: 100 EACH | Refills: 12 | Status: SHIPPED | OUTPATIENT
Start: 2025-06-04

## 2025-06-04 RX ORDER — LANCETS
EACH MISCELLANEOUS
Qty: 100 EACH | Refills: 12 | Status: SHIPPED | OUTPATIENT
Start: 2025-06-04 | End: 2025-06-04 | Stop reason: SDUPTHER

## 2025-06-04 NOTE — PROGRESS NOTES
Subjective   Gerda Bolden is a 69 y.o. female. Presents today for   Chief Complaint   Patient presents with    Diabetes     Blood Sugar Elevated       HPI: Gerda Bolden is a 69-year-old female coming in today with a complaint of high blood sugars she is 1 day too early for her annual wellness visit.    Anxiety  Diabetes mellitus type 2  Dyslipidemia  Hypothyroidism  History of Present Illness  The patient presents for evaluation of diabetes mellitus and GERD.    She reports a significant increase in her blood glucose levels yesterday evening, which she attributes to recent food intake. Despite being prescribed metformin ER by Dr. Lamas, she perceived it as ineffective in managing her elevated blood glucose levels. Consequently, she reverted to regular metformin, taking 500 mg twice daily, a regimen she initiated approximately 4 days ago. Her lifestyle is largely sedentary, with minimal physical activity. Dietary habits include the consumption of Coke, tea, and carbohydrates, although she acknowledges the need for carbohydrate reduction. She maintains a high water intake and has made efforts to reduce sugar in her tea. Diet is predominantly processed, with a recent shift towards fast food over home-cooked meals. She consumed half a can of Coke the previous night, along with a tuna sandwich and sweet tea, followed by half a [employer] pizza later in the evening. Interestingly, she notes that her blood glucose levels decreased after consuming the pizza. She experiences hip pain during prolonged walking and foot discomfort when standing for extended periods. She expresses interest in receiving dietary guidance and is open to consulting with a dietitian. She reports no numbness, tingling, or blurry vision. She has not had an eye examination within the past year but plans to schedule one upon returning home.    She had a scope done a couple of weeks ago and was told she has Peña's esophagus and mild GERD. She takes  Dexilant for her GERD and does not watch what she eats.    MEDICATIONS  Current: Metformin, Dexilant    Patient Active Problem List   Diagnosis    Abnormal CT scan of heart    Abnormal levels of other serum enzymes    Adhesive capsulitis of left shoulder    Anxiety    Arteriosclerosis of coronary artery    Benign essential hypertension    Bilateral hearing loss    Bradycardia    Chest pain due to myocardial ischemia    Chest pain    Ischemic chest pain    Chronic obstructive pulmonary disease    Nausea    Diabetes mellitus    Disequilibrium    Dyslipidemia    Ex-smoker    Fatigue    Hiatal hernia    Hyperlipidemia    Thyroid disease    Immunization due    Increased glucose level    Melanocytic nevus    Mixed anxiety and depressive disorder    High blood pressure    Atrial fibrillation    Postural hypotension    Breast cancer screening    Encounter for follow-up examination after completed treatment for conditions other than malignant neoplasm    Preop cardiovascular exam    Presence of coronary angioplasty implant and graft    Rectal hemorrhage    Shoulder pain    Skin lesion    Syncope    Thickened endometrium    Tobacco user    Unstable angina    Vitamin B12 deficiency    Vitamin D deficiency    Peña's esophagus    Dvrtclos of lg int w/o perforation or abscess w/o bleeding    Early satiety    Gastro-esophageal reflux disease with esophagitis    Hemochromatosis, unspecified    Long term (current) use of anticoagulants    Nonalcoholic steatohepatitis (BELLAMY)    Polyp of colon    Personal history of colonic polyps    Dysphagia    RUQ pain       Social History     Socioeconomic History    Marital status:    Tobacco Use    Smoking status: Former     Current packs/day: 0.00     Average packs/day: 1.5 packs/day for 46.0 years (69.0 ttl pk-yrs)     Types: Cigarettes     Start date: 1971     Quit date: 2017     Years since quittin.4     Passive exposure: Past    Smokeless tobacco: Never   Vaping Use     "Vaping status: Never Used   Substance and Sexual Activity    Alcohol use: Never    Drug use: Never    Sexual activity: Not Currently     Partners: Male       Allergies   Allergen Reactions    Ezetimibe Unknown - Low Severity    Statins Other (See Comments)     Muscle aches       Current Outpatient Medications on File Prior to Visit   Medication Sig Dispense Refill    Alirocumab (Praluent) 75 MG/ML solution auto-injector Inject 1 mL under the skin into the appropriate area as directed Every 14 (Fourteen) Days. 6 mL 3    amitriptyline (ELAVIL) 10 MG tablet Take 1 tablet by mouth Daily.      amLODIPine (NORVASC) 2.5 MG tablet Take 3 tablets by mouth Every Night. 90 tablet 0    aspirin 81 MG EC tablet Take 1 tablet by mouth Daily.      Calcium Carbonate Antacid (CALCIUM CARBONATE PO) tablet      Cholecalciferol (Vitamin D3) 50 MCG (2000 UT) capsule Take 1 capsule by mouth Daily. 90 capsule 0    dexlansoprazole (DEXILANT) 60 MG capsule Take 1 capsule by mouth Daily. 90 capsule 3    famotidine (PEPCID) 40 MG tablet Take 1 tablet by mouth Every Night. 90 tablet 3    isosorbide mononitrate (IMDUR) 60 MG 24 hr tablet Take 1 tablet by mouth Daily. 90 tablet 0    levothyroxine (SYNTHROID, LEVOTHROID) 75 MCG tablet TAKE 1 TABLET BY MOUTH DAILY 30 tablet 5    metFORMIN ER (GLUCOPHAGE-XR) 500 MG 24 hr tablet Take 1 tablet by mouth Daily. 90 tablet 3    nitroglycerin (NITROSTAT) 0.4 MG SL tablet       Vitamin E 180 MG (400 UNIT) capsule capsule Take 1 capsule by mouth Daily. 90 capsule 1     No current facility-administered medications on file prior to visit.       Objective   Vitals:    06/04/25 1336   BP: 138/76   Pulse: 78   SpO2: 98%   Weight: 70.2 kg (154 lb 12.8 oz)   Height: 165.1 cm (65\")     Body mass index is 25.76 kg/m².    Physical Exam  Neck was examined.  Lungs sound normal.  Heart sounds normal.  Physical Exam  Constitutional:       Appearance: She is obese.   HENT:      Head: Normocephalic and atraumatic.      " Mouth/Throat:      Mouth: Mucous membranes are moist.   Eyes:      Pupils: Pupils are equal, round, and reactive to light.   Cardiovascular:      Rate and Rhythm: Normal rate and regular rhythm.      Pulses: Normal pulses.      Heart sounds: Normal heart sounds.   Pulmonary:      Effort: Pulmonary effort is normal.      Breath sounds: Normal breath sounds.   Abdominal:      General: Bowel sounds are normal.   Musculoskeletal:         General: Normal range of motion.   Skin:     General: Skin is warm and dry.      Capillary Refill: Capillary refill takes less than 2 seconds.   Neurological:      General: No focal deficit present.      Mental Status: She is alert.   Psychiatric:         Mood and Affect: Mood normal.       Results  Laboratory Studies  A1c was 6.8 in January.       Procedures     Assessment & Plan   Diagnoses and all orders for this visit:    1. Type 2 diabetes mellitus with hyperglycemia, without long-term current use of insulin (Primary)  -     Microalbumin / Creatinine Urine Ratio - Urine, Clean Catch  -     Tirzepatide 2.5 MG/0.5ML solution auto-injector; Inject 2.5 mg under the skin into the appropriate area as directed 1 (One) Time Per Week.  Dispense: 2 mL; Refill: 0  -     Hemoglobin A1c; Future  -     Lancets (onetouch ultrasoft) lancets; Use one lancet twice daily to check blood sugars as discussed  Dispense: 100 each; Refill: 12    2. Lipid screening    3. Encounter for screening for lung cancer  -     CT chest low dose wo; Future    4. Personal history of nicotine dependence  -     CT chest low dose wo; Future    5. Personal history of tobacco use, presenting hazards to health    6. At high risk for osteoporosis  -     DEXA Bone Density Axial    7. Age-related osteoporosis without current pathological fracture  -     DEXA Bone Density Axial    Other orders  -     Discontinue: Lancets (onetouch ultrasoft) lancets  Dispense: 100 each; Refill: 12         Assessment & Plan  1. Diabetes  Mellitus.  Her A1c level was recorded at 6.8 in January 2025. She has been advised to continue her current regimen of metformin 500 mg twice a day. Additionally, a GLP-1 injectable medication will be introduced into her treatment plan to help regulate her blood sugar levels and control hunger hormones. She has been counseled on the importance of maintaining a balanced diet and incorporating regular exercise into her routine. Recommendations include reducing the intake of fast foods, increasing home-cooked meals, ensuring daily vegetable consumption, and pairing fruits with proteins. She has also been encouraged to maintain a food journal to monitor the impact of different foods on her blood sugar levels. Walking after meals and gradually introducing resistance training exercises have been suggested. A consultation with a dietitian will be arranged for further dietary guidance. A urine test for microalbumin will be conducted today to assess kidney function, and an A1c test will be performed to monitor blood sugar control.    2. Gastroesophageal Reflux Disease (GERD).  She is currently taking Dexilant for GERD management. She has been advised to avoid certain foods and behaviors that may exacerbate her symptoms, such as drinking through a straw.           BMI is >= 25 and <30. (Overweight) The following options were offered after discussion;: weight loss educational material (shared in after visit summary), exercise counseling/recommendations, nutrition counseling/recommendations, and pharmacological intervention options     Return in about 3 months (around 9/4/2025) for Next scheduled follow up.     Discussed Care Gaps, ordered referrals and encouraged vaccination updates.       - Pt agrees with plan of care and denies further questions/concerns today  - This document is intended for medical expert use only. Persons  reading this document without medical staff guidance may result in misinterpretation and unintended  morbidity     Go to the ER for any possible life-threatening symptoms such as chest pain or shortness of air.      Please allow 3-5 business days for recommendations based on new results      I personally spent time with this patient, preparing for the visit, reviewing tests, obtaining and/or reviewing a separately obtained history, performing a medically appropriate examination and/or evaluation, counseling and educating the patient/family/caregiver, ordering medications,  documenting information in the medical record and indepentently interpreting results.         Patient or patient representative verbalized consent for the use of Ambient Listening during the visit with  HARLEEN Childers for chart documentation. 2025  14:30 EDT     Answers submitted by the patient for this visit:  Diabetes Questionnaire (Submitted on 6/3/2025)  Chief Complaint: Diabetes problem  Diabetes type: type 2  MedicAlert ID: No  Treatment compliance: none of the time  Symptom course: worsening  Home blood tests: 3-4 x per day  High score: >200  Below 70: never  blurred vision: No  foot paresthesias: No  foot ulcerations: No  polydipsia: No  polyuria: No  weight loss: No  confusion: No  headaches: No  speech difficulty: No  sweats: No  tremors: Yes  Current diet: diabetic, high fat/cholesterol, high salt  Meal planning: none  Exercise: never  Eye exam current: No  Sees podiatrist: No     Low-Dose Lung Cancer CT Screening Visit    CHIEF COMPLAINT:    Shared Decision Making  I am discussing tobacco cessation today with Gerda Bolden    SMOKING HISTORY:     Social History     Tobacco Use   Smoking Status Former    Current packs/day: 0.00    Average packs/day: 1.5 packs/day for 46.0 years (69.0 ttl pk-yrs)    Types: Cigarettes    Start date: 1971    Quit date: 2017    Years since quittin.4    Passive exposure: Past   Smokeless Tobacco Never       SUBJECTIVE:     Gerda Bolden is a former smoker quitting 8 years ago with a  40   "pack year history.  she reports no use of alternate forms of tobacco, electronic cigarettes, marijuana or other substances.  Based on the recommendation of the [place] Preventive Services Task Force, this patient is at high risk for lung cancer and a low-dose CT screening scan is recommended.     The patient has had no hemoptysis, unintentional weight loss or increasing shortness of breath. The patient is asymptomatic and has no signs or symptoms of lung cancer.     Together we discussed the potential benefits and potential harms of being screened for lung cancer including the potential for follow up diagnostic testing, risk for over diagnosis, false positive rate and radiation exposure using the [facility] Lung Cancer Screening Shared Decision-Making Tool. A copy of this decision aid resource has been provided in the after visit summary.  We also reviewed the patient's smoking history and counseled her on the importance and health benefits of maintaining cigarette smoking abstinence.      OBJECTIVE:    /76   Pulse 78   Ht 165.1 cm (65\")   Wt 70.2 kg (154 lb 12.8 oz)   SpO2 98%   BMI 25.76 kg/m²   General: no distress, alert and oriented  Chest: Lung sounds are clear to auscultation, no wheezes, rales or rhonchi, with symmetric air entry. No respiratory distress  Cardiovascular: RRR with no murmur auscultated      Continued Smoking Abstinence discussion:     We discussed that there are a number of resources and interventions to assist with smoking cessation if needed in the future.   On a scale of zero to ten, the patient rates their motivation to stay quit at a 10 out of 10 today.  The patient is confident that they will never smoke in the future.    Recommendations for continued lung cancer screening:      We discussed the NCCN guidelines for lung cancer screening and the patient verbalized understanding that annual screening is recommended until fifteen years beyond smoking as long as they " have no other disease or comorbidity that would prevent them from receiving cancer treatments such as surgery should a lung cancer be detected.  After review of the NCCN guidelines and recommendations for ongoing screening, the patient verbalized understanding of recommendations for follow-up.  The patient has decided to proceed with a Low Dose Lung Cancer Screening CT today.       20minutes face-to-face spent counseling patient on the continued health benefits of having quit tobacco, maintaining smoking abstinence, smoking cessation strategies and resources, as well as the importance of adherence to annual lung cancer low-dose CT screening.

## 2025-06-05 ENCOUNTER — TELEPHONE (OUTPATIENT)
Dept: FAMILY MEDICINE CLINIC | Facility: CLINIC | Age: 70
End: 2025-06-05
Payer: MEDICARE

## 2025-06-05 DIAGNOSIS — E11.65 TYPE 2 DIABETES MELLITUS WITH HYPERGLYCEMIA, WITHOUT LONG-TERM CURRENT USE OF INSULIN: ICD-10-CM

## 2025-06-05 LAB
ALBUMIN/CREAT UR: 6 MG/G CREAT (ref 0–29)
CREAT UR-MCNC: 236.7 MG/DL
MICROALBUMIN UR-MCNC: 13.6 UG/ML

## 2025-06-05 NOTE — TELEPHONE ENCOUNTER
Caller: Gerda Bolden    Relationship: Self    Best call back number: [phone]     Which medication are you concerned about: ROBYN    Who prescribed you this medication: LISA LOVE    What are your concerns: PATIENT STATED HER PHARMACY WILL NOT FILL THIS PRESCRIPTION BECAUSE THEY THINK IT IS A REFILL.    PATIENT STATED SHE NEEDS A NEW PRESCRIPTION SENT TO [id number] - [place], [place] - [address] AT [facility] [place] [facility] [phone] [facility] [phone]   AGAIN SO THEY WILL FILL THE PRESCRIPTION.    PATIENT STATED SHE DOES NOT HAVE ANY OF THIS MEDICATION AS SHE HAS BEEN UNABLE TO PICK THIS UP.

## 2025-06-10 ENCOUNTER — TELEPHONE (OUTPATIENT)
Dept: FAMILY MEDICINE CLINIC | Facility: CLINIC | Age: 70
End: 2025-06-10
Payer: MEDICARE

## 2025-06-10 DIAGNOSIS — E11.65 TYPE 2 DIABETES MELLITUS WITH HYPERGLYCEMIA, WITHOUT LONG-TERM CURRENT USE OF INSULIN: ICD-10-CM

## 2025-06-10 NOTE — TELEPHONE ENCOUNTER
Pt stopped in today to have the Dexcom 7 put on. While she was here she stated the Mounjaro is really expensive through her local pharmacy and she is hoping that sending them through meds by mail, it might be cheaper. I re-sent to meds by mail.

## 2025-06-12 DIAGNOSIS — E11.65 TYPE 2 DIABETES MELLITUS WITH HYPERGLYCEMIA, WITHOUT LONG-TERM CURRENT USE OF INSULIN: ICD-10-CM

## 2025-06-18 DIAGNOSIS — I10 PRIMARY HYPERTENSION: ICD-10-CM

## 2025-06-18 RX ORDER — AMLODIPINE BESYLATE 2.5 MG/1
7.5 TABLET ORAL NIGHTLY
Qty: 90 TABLET | Refills: 0 | Status: SHIPPED | OUTPATIENT
Start: 2025-06-18

## 2025-07-22 ENCOUNTER — OFFICE VISIT (OUTPATIENT)
Dept: FAMILY MEDICINE CLINIC | Facility: CLINIC | Age: 70
End: 2025-07-22
Payer: MEDICARE

## 2025-07-22 VITALS
HEIGHT: 65 IN | HEART RATE: 77 BPM | BODY MASS INDEX: 24.49 KG/M2 | DIASTOLIC BLOOD PRESSURE: 64 MMHG | WEIGHT: 147 LBS | OXYGEN SATURATION: 97 % | SYSTOLIC BLOOD PRESSURE: 132 MMHG

## 2025-07-22 DIAGNOSIS — E78.2 MIXED HYPERLIPIDEMIA: ICD-10-CM

## 2025-07-22 DIAGNOSIS — E03.9 ACQUIRED HYPOTHYROIDISM: ICD-10-CM

## 2025-07-22 DIAGNOSIS — I25.10 CORONARY ARTERY DISEASE INVOLVING NATIVE CORONARY ARTERY OF NATIVE HEART WITHOUT ANGINA PECTORIS: ICD-10-CM

## 2025-07-22 DIAGNOSIS — E53.8 VITAMIN B12 DEFICIENCY: ICD-10-CM

## 2025-07-22 DIAGNOSIS — H81.11 BENIGN PAROXYSMAL POSITIONAL VERTIGO OF RIGHT EAR: ICD-10-CM

## 2025-07-22 DIAGNOSIS — I10 BENIGN ESSENTIAL HYPERTENSION: ICD-10-CM

## 2025-07-22 DIAGNOSIS — Z00.00 MEDICARE ANNUAL WELLNESS VISIT, SUBSEQUENT: Primary | ICD-10-CM

## 2025-07-22 DIAGNOSIS — E55.9 VITAMIN D DEFICIENCY: ICD-10-CM

## 2025-07-22 DIAGNOSIS — E11.65 TYPE 2 DIABETES MELLITUS WITH HYPERGLYCEMIA, WITHOUT LONG-TERM CURRENT USE OF INSULIN: ICD-10-CM

## 2025-07-22 PROBLEM — M75.02 ADHESIVE CAPSULITIS OF LEFT SHOULDER: Status: RESOLVED | Noted: 2018-02-13 | Resolved: 2025-07-22

## 2025-07-22 PROBLEM — R13.10 DYSPHAGIA: Status: RESOLVED | Noted: 2022-04-26 | Resolved: 2025-07-22

## 2025-07-22 PROBLEM — F41.9 ANXIETY: Status: RESOLVED | Noted: 2018-02-13 | Resolved: 2025-07-22

## 2025-07-22 PROBLEM — D22.9 MELANOCYTIC NEVUS: Status: RESOLVED | Noted: 2018-03-30 | Resolved: 2025-07-22

## 2025-07-22 PROBLEM — M25.519 SHOULDER PAIN: Status: RESOLVED | Noted: 2017-12-18 | Resolved: 2025-07-22

## 2025-07-22 PROBLEM — R93.89 THICKENED ENDOMETRIUM: Status: RESOLVED | Noted: 2018-05-22 | Resolved: 2025-07-22

## 2025-07-22 PROBLEM — Z87.891 EX-SMOKER: Status: RESOLVED | Noted: 2017-08-27 | Resolved: 2025-07-22

## 2025-07-22 PROBLEM — Z12.39 BREAST CANCER SCREENING: Status: RESOLVED | Noted: 2017-09-28 | Resolved: 2025-07-22

## 2025-07-22 PROBLEM — R74.8 ABNORMAL LEVELS OF OTHER SERUM ENZYMES: Status: RESOLVED | Noted: 2018-03-31 | Resolved: 2025-07-22

## 2025-07-22 PROBLEM — K62.5 RECTAL HEMORRHAGE: Status: RESOLVED | Noted: 2018-04-17 | Resolved: 2025-07-22

## 2025-07-22 PROBLEM — R68.81 EARLY SATIETY: Status: RESOLVED | Noted: 2023-04-06 | Resolved: 2025-07-22

## 2025-07-22 PROBLEM — K57.30 DVRTCLOS OF LG INT W/O PERFORATION OR ABSCESS W/O BLEEDING: Status: RESOLVED | Noted: 2022-04-26 | Resolved: 2025-07-22

## 2025-07-22 PROBLEM — R11.0 NAUSEA: Status: RESOLVED | Noted: 2017-04-06 | Resolved: 2025-07-22

## 2025-07-22 PROBLEM — Z86.0100 HISTORY OF COLONIC POLYPS: Status: RESOLVED | Noted: 2023-04-06 | Resolved: 2025-07-22

## 2025-07-22 PROBLEM — R53.83 FATIGUE: Status: RESOLVED | Noted: 2017-06-16 | Resolved: 2025-07-22

## 2025-07-22 PROBLEM — R55 SYNCOPE: Status: RESOLVED | Noted: 2017-07-02 | Resolved: 2025-07-22

## 2025-07-22 PROCEDURE — 1170F FXNL STATUS ASSESSED: CPT | Performed by: FAMILY MEDICINE

## 2025-07-22 PROCEDURE — 3078F DIAST BP <80 MM HG: CPT | Performed by: FAMILY MEDICINE

## 2025-07-22 PROCEDURE — G0439 PPPS, SUBSEQ VISIT: HCPCS | Performed by: FAMILY MEDICINE

## 2025-07-22 PROCEDURE — G2211 COMPLEX E/M VISIT ADD ON: HCPCS | Performed by: FAMILY MEDICINE

## 2025-07-22 PROCEDURE — 3052F HG A1C>EQUAL 8.0%<EQUAL 9.0%: CPT | Performed by: FAMILY MEDICINE

## 2025-07-22 PROCEDURE — 99214 OFFICE O/P EST MOD 30 MIN: CPT | Performed by: FAMILY MEDICINE

## 2025-07-22 PROCEDURE — 1160F RVW MEDS BY RX/DR IN RCRD: CPT | Performed by: FAMILY MEDICINE

## 2025-07-22 PROCEDURE — 3075F SYST BP GE 130 - 139MM HG: CPT | Performed by: FAMILY MEDICINE

## 2025-07-22 PROCEDURE — 1126F AMNT PAIN NOTED NONE PRSNT: CPT | Performed by: FAMILY MEDICINE

## 2025-07-22 PROCEDURE — 1159F MED LIST DOCD IN RCRD: CPT | Performed by: FAMILY MEDICINE

## 2025-07-22 NOTE — PROGRESS NOTES
QUICK REFERENCE INFORMATION:  The ABCs of the Annual Wellness Visit    subsequent Medicare Wellness Visit    HEALTH RISK ASSESSMENT    1955  Gerda Bolden is a 69 y.o. female who presents for an Subsequent Wellness Visit.    The following portions of the patient's history were reviewed and   updated as appropriate: allergies, current medications, past family history, past medical history, past social history, past surgical history, and problem list.    Compared to one year ago, the patient feels his physical   health is the same.    Compared to one year ago, the patient feels his mental   health is the same.    Recent Hospitalizations:  She was not admitted to the hospital during the last year.     Current Medical Providers:  Patient Care Team:  Siddharth Lamas DO as PCP - General (Family Medicine)  Kayden Vazquez DPM as Consulting Physician (Podiatry)  Benjamín Sarkar MD (Dermatology)    I reviewed list of current Medical providers and suppliers with patient and are listed above to the best of the patient's and my knowledge.    Smoking Status:  Social History     Tobacco Use   Smoking Status Former    Current packs/day: 0.00    Average packs/day: 1.5 packs/day for 46.0 years (69.0 ttl pk-yrs)    Types: Cigarettes    Start date: 1971    Quit date: 2017    Years since quittin.5    Passive exposure: Past   Smokeless Tobacco Never       Alcohol Consumption:  Social History     Substance and Sexual Activity   Alcohol Use Never       Depression Screen:       2025     1:00 PM   PHQ-2/PHQ-9 Depression Screening   Little interest or pleasure in doing things Not at all   Feeling down, depressed, or hopeless Not at all       Health Habits and Functional and Cognitive Screenin/22/2025     1:00 PM   Functional & Cognitive Status   Do you have difficulty preparing food and eating? No   Do you have difficulty bathing yourself, getting dressed or grooming yourself? No   Do you have difficulty  using the toilet? No   Do you have difficulty moving around from place to place? No   Do you have trouble with steps or getting out of a bed or a chair? No   Current Diet Well Balanced Diet   Dental Exam Not up to date   Eye Exam Not up to date   Exercise (times per week) 1 times per week   Current Exercises Include House Cleaning   Do you need help using the phone?  No   Are you deaf or do you have serious difficulty hearing?  Yes   Do you need help to go to places out of walking distance? No   Do you need help shopping? No   Do you need help preparing meals?  No   Do you need help with housework?  No   Do you need help with laundry? No   Do you need help taking your medications? No   Do you need help managing money? No   Do you ever drive or ride in a car without wearing a seat belt? No   Have you felt unusual fatigue (could be tiredness), stress, anger or loneliness in the last month? No   Who do you live with? Spouse   If you need help, do you have trouble finding someone available to you? No   Have you been bothered in the last four weeks by sexual problems? No   Do you have difficulty concentrating, remembering or making decisions? No       Does the patient have evidence of cognitive impairment? No    Aspirin use counseling: Does not need ASA (and currently is not on it)    Recent Lab Results:  CMP:  Lab Results   Component Value Date    Glucose 247 (A) 05/31/2025    Glucose 121 (H) 02/23/2021    Glucose, UA Negative 09/04/2024    Glucose, Urine Negative 04/17/2018    BUN 13 01/15/2025    BUN 13 02/23/2021    BUN/Creatinine Ratio 15 01/15/2025    BUN/Creatinine Ratio 13.0 02/23/2021    Creatinine 0.88 01/15/2025    Creatinine 1.00 12/13/2021    Creatinine, Urine 236.7 06/04/2025    Ketones Negative 09/04/2024    Total CO2 27 01/15/2025    Calcium 10.3 01/15/2025    Calcium 9.2 02/23/2021    Albumin 4.6 09/04/2024    Albumin 3.3 (L) 04/19/2018    AST (SGOT) 41 (H) 09/04/2024    AST (SGOT) 88 (H) 02/23/2021     "ALT (SGPT) 42 (H) 09/04/2024    ALT (SGPT) 109 (H) 02/23/2021     Lipid Panel:  Lab Results   Component Value Date    Chol/HDL Ratio 3.8 06/04/2018    Total Cholesterol 183 01/15/2025    Total Cholesterol 241 (H) 06/04/2018    Triglycerides 165 (H) 01/15/2025    Triglycerides 144 06/04/2018    HDL Cholesterol 54 01/15/2025    HDL Cholesterol 63 06/04/2018    VLDL Cholesterol 29 06/04/2018    VLDL Cholesterol Daren 29 01/15/2025     HbA1c:  No components found for: \"HGBA1C\"    Visual Acuity:  No results found.    Age-appropriate Screening Schedule:  Refer to the list below for future screening recommendations based on patient's age, sex and/or medical conditions. Orders for these recommended tests are listed in the plan section. The patient has been provided with a written plan.    Health Maintenance   Topic Date Due    LUNG CANCER SCREENING  01/11/2024    DXA SCAN  07/11/2024    DIABETIC EYE EXAM  07/23/2025 (Originally 6/10/2023)    ZOSTER VACCINE (1 of 2) 08/05/2025 (Originally 11/12/2005)    TDAP/TD VACCINES (2 - Td or Tdap) 09/04/2025 (Originally 7/17/2022)    Hepatitis B (2 of 3 - Risk 3-dose series) 12/01/2025 (Originally 10/17/2024)    COVID-19 Vaccine (3 - 2024-25 season) 01/22/2026 (Originally 9/1/2024)    INFLUENZA VACCINE  10/01/2025    HEMOGLOBIN A1C  12/16/2025    LIPID PANEL  01/15/2026    DIABETIC FOOT EXAM  06/04/2026    URINE MICROALBUMIN-CREATININE RATIO (uACR)  06/04/2026    ANNUAL WELLNESS VISIT  07/22/2026    MAMMOGRAM  09/25/2026    COLORECTAL CANCER SCREENING  04/26/2027    HEPATITIS C SCREENING  Completed    Pneumococcal Vaccine 50+  Completed          Outpatient Medications Prior to Visit   Medication Sig Dispense Refill    Alirocumab (Praluent) 75 MG/ML solution auto-injector Inject 1 mL under the skin into the appropriate area as directed Every 14 (Fourteen) Days. 6 mL 3    amitriptyline (ELAVIL) 10 MG tablet Take 1 tablet by mouth Daily.      amLODIPine (NORVASC) 2.5 MG tablet TAKE 3 " TABLETS BY MOUTH EVERY NIGHT 90 tablet 0    aspirin 81 MG EC tablet Take 1 tablet by mouth Daily.      Calcium Carbonate Antacid (CALCIUM CARBONATE PO) tablet      Cholecalciferol (Vitamin D3) 50 MCG (2000 UT) capsule Take 1 capsule by mouth Daily. 90 capsule 0    dexlansoprazole (DEXILANT) 60 MG capsule Take 1 capsule by mouth Daily. 90 capsule 3    famotidine (PEPCID) 40 MG tablet Take 1 tablet by mouth Every Night. 90 tablet 3    isosorbide mononitrate (IMDUR) 60 MG 24 hr tablet Take 1 tablet by mouth Daily. 90 tablet 0    Lancets (onetouch ultrasoft) lancets Use one lancet twice daily to check blood sugars as discussed 100 each 12    levothyroxine (SYNTHROID, LEVOTHROID) 75 MCG tablet TAKE 1 TABLET BY MOUTH DAILY 30 tablet 5    Vitamin E 180 MG (400 UNIT) capsule capsule Take 1 capsule by mouth Daily. 90 capsule 1    metFORMIN ER (GLUCOPHAGE-XR) 500 MG 24 hr tablet Take 1 tablet by mouth Daily. 90 tablet 3    nitroglycerin (NITROSTAT) 0.4 MG SL tablet  (Patient not taking: Reported on 7/22/2025)      Tirzepatide 2.5 MG/0.5ML solution auto-injector Inject 2.5 mg under the skin into the appropriate area as directed 1 (One) Time Per Week. (Patient not taking: Reported on 7/22/2025) 2 mL 0     No facility-administered medications prior to visit.       Patient Active Problem List   Diagnosis    Abnormal CT scan of heart    Arteriosclerosis of coronary artery    Benign essential hypertension    Bilateral hearing loss    Bradycardia    Chest pain due to myocardial ischemia    Ischemic chest pain    Chronic obstructive pulmonary disease    Diabetes mellitus    Dyslipidemia    Hiatal hernia    Hyperlipidemia    Thyroid disease    Increased glucose level    Mixed anxiety and depressive disorder    Atrial fibrillation    Postural hypotension    Preop cardiovascular exam    Presence of coronary angioplasty implant and graft    Skin lesion    Vitamin B12 deficiency    Vitamin D deficiency    Peña's esophagus     Gastro-esophageal reflux disease with esophagitis    Hemochromatosis, unspecified    Long term (current) use of anticoagulants    Nonalcoholic steatohepatitis (BELLAMY)    Polyp of colon    RUQ pain       Advance Care Planning:  ACP discussion was held with the patient during this visit. Patient does not have an advance directive, information provided.    Identification of Risk Factors:  Risk factors include: Obesity/Overweight .    Review of Systems   Constitutional:  Unexpected weight change: Weight gain or loss.   Respiratory:  Negative for shortness of breath and wheezing.    Cardiovascular:  Negative for chest pain, palpitations and leg swelling.   Gastrointestinal:  Negative for abdominal pain, nausea and vomiting.   Musculoskeletal:  Negative for myalgias.   Neurological:  Positive for dizziness. Negative for tremors, syncope, facial asymmetry, speech difficulty, weakness, light-headedness, numbness and headaches.       Objective     Physical Exam  Vitals and nursing note reviewed.   Constitutional:       Appearance: She is well-developed.   HENT:      Head: Normocephalic and atraumatic.   Eyes:      Extraocular Movements: Extraocular movements intact.      Pupils: Pupils are equal, round, and reactive to light.   Neck:      Thyroid: No thyromegaly.      Vascular: No JVD.   Cardiovascular:      Rate and Rhythm: Normal rate and regular rhythm.      Heart sounds: Normal heart sounds. No murmur heard.     No friction rub. No gallop.   Pulmonary:      Effort: Pulmonary effort is normal. No respiratory distress.      Breath sounds: Normal breath sounds. No wheezing or rales.   Abdominal:      General: Bowel sounds are normal. There is no distension.      Palpations: Abdomen is soft.      Tenderness: There is no abdominal tenderness. There is no guarding or rebound.   Musculoskeletal:      Cervical back: Neck supple.   Skin:     General: Skin is warm and dry.   Neurological:      Mental Status: She is alert.       "Cranial Nerves: No cranial nerve deficit.      Motor: No weakness, abnormal muscle tone or pronator drift.      Coordination: Coordination normal. Finger-Nose-Finger Test and Heel to Shin Test normal. Rapid alternating movements normal.      Gait: Gait normal.      Deep Tendon Reflexes: Reflexes normal.      Comments: +Barbra Hallpike on right   Psychiatric:         Behavior: Behavior normal.       Physical Exam  Neurological: No focal deficits noted. Nystagmus observed during [place]-Hallpike maneuver.  Respiratory: Clear to auscultation, no wheezing, rales or rhonchi  Cardiovascular: Regular rate and rhythm, no murmurs, rubs, or gallops  Gastrointestinal: Soft, no tenderness, no distention, no masses    Vitals:    07/22/25 1312   BP: 132/64   Pulse: 77   SpO2: 97%   Weight: 66.7 kg (147 lb)   Height: 165.1 cm (65\")   PainSc: 0-No pain     Body mass index is 24.46 kg/m².    BMI is within normal parameters. No other follow-up for BMI required.      Assessment & Plan   Patient Self-Management and Personalized Health Advice  The patient has been provided with information about: diet and exercise and preventive services including:   Annual Wellness Visit (AWV).    Visit Diagnoses:    ICD-10-CM ICD-9-CM   1. Medicare annual wellness visit, subsequent  Z00.00 V70.0   2. Type 2 diabetes mellitus with hyperglycemia, without long-term current use of insulin  E11.65 250.00     790.29   3. Benign essential hypertension  I10 401.1   4. Acquired hypothyroidism  E03.9 244.9   5. Vitamin B12 deficiency  E53.8 266.2   6. Vitamin D deficiency  E55.9 268.9   7. Mixed hyperlipidemia  E78.2 272.2   8. Coronary artery disease involving native coronary artery of native heart without angina pectoris  I25.10 414.01   9. Benign paroxysmal positional vertigo of right ear  H81.11 386.11       Orders Placed This Encounter   Procedures    CBC (No Diff)     Release to patient:   Routine Release [[id number]]    Comprehensive Metabolic Panel     Release " to patient:   Routine Release [[id number]]    Lipid Panel     Release to patient:   Routine Release [[id number]]    Hemoglobin A1c     Release to patient:   Routine Release [[id number]]    TSH     Release to patient:   Routine Release [[id number]]    Glutamic Acid Decarboxylase     Release to patient:   Routine Release [[id number]]    C-Peptide     Release to patient:   Routine Release [[id number]]    Insulin, Total     Release to patient:   Routine Release [[id number]]    Beta-Hydroxybutyrate     Release to patient:   Routine Release [[id number]]    Insulin Antibody     Release to patient:   Routine Release [[id number]]    Vitamin D,25-Hydroxy     Release to patient:   Routine Release [[id number]]    Vitamin B12     Release to patient:   Routine Release [[id number]]       Outpatient Encounter Medications as of 7/22/2025   Medication Sig Dispense Refill    Alirocumab (Praluent) 75 MG/ML solution auto-injector Inject 1 mL under the skin into the appropriate area as directed Every 14 (Fourteen) Days. 6 mL 3    amitriptyline (ELAVIL) 10 MG tablet Take 1 tablet by mouth Daily.      amLODIPine (NORVASC) 2.5 MG tablet TAKE 3 TABLETS BY MOUTH EVERY NIGHT 90 tablet 0    aspirin 81 MG EC tablet Take 1 tablet by mouth Daily.      Calcium Carbonate Antacid (CALCIUM CARBONATE PO) tablet      Cholecalciferol (Vitamin D3) 50 MCG (2000 UT) capsule Take 1 capsule by mouth Daily. 90 capsule 0    dexlansoprazole (DEXILANT) 60 MG capsule Take 1 capsule by mouth Daily. 90 capsule 3    famotidine (PEPCID) 40 MG tablet Take 1 tablet by mouth Every Night. 90 tablet 3    isosorbide mononitrate (IMDUR) 60 MG 24 hr tablet Take 1 tablet by mouth Daily. 90 tablet 0    Lancets (onetouch ultrasoft) lancets Use one lancet twice daily to check blood sugars as discussed 100 each 12    levothyroxine (SYNTHROID, LEVOTHROID) 75 MCG tablet TAKE 1 TABLET BY MOUTH DAILY 30 tablet 5    Vitamin E 180 MG (400 UNIT) capsule capsule Take 1 capsule by mouth  Daily. 90 capsule 1    [DISCONTINUED] metFORMIN ER (GLUCOPHAGE-XR) 500 MG 24 hr tablet Take 1 tablet by mouth Daily. 90 tablet 3    metFORMIN (GLUCOPHAGE) 500 MG tablet Take 1 tablet by mouth 2 (Two) Times a Day With Meals.      nitroglycerin (NITROSTAT) 0.4 MG SL tablet  (Patient not taking: Reported on 2025)      [DISCONTINUED] Tirzepatide 2.5 MG/0.5ML solution auto-injector Inject 2.5 mg under the skin into the appropriate area as directed 1 (One) Time Per Week. (Patient not taking: Reported on 2025) 2 mL 0     No facility-administered encounter medications on file as of 2025.       Reviewed use of high risk medication in the elderly: yes  Reviewed for potential of harmful drug interactions in the elderly: yes  No opioid medication identified on active medication list. I have reviewed chart for other potential  high risk medication/s and harmful drug interactions in the elderly.    Social History     Socioeconomic History    Marital status:    Tobacco Use    Smoking status: Former     Current packs/day: 0.00     Average packs/day: 1.5 packs/day for 46.0 years (69.0 ttl pk-yrs)     Types: Cigarettes     Start date: 1971     Quit date: 2017     Years since quittin.5     Passive exposure: Past    Smokeless tobacco: Never   Vaping Use    Vaping status: Never Used   Substance and Sexual Activity    Alcohol use: Never    Drug use: Never    Sexual activity: Not Currently     Partners: Male     Patient was screened for OUD and TIMOTHY risk factors.  Gerda Bolden's pain level was assessed as well today.  I included a review current recommendations on pain management, best use practices, current CDC guidelines, alternatives to opioids including OTC & Rx topicals, non-opioid oral medications (eg nsaids, acetominophen) and life style interventions such as yoga, felicitas chi, stretching, regular exercise, PT/OT and referral to specialty care like Pain Management that specialize in pain treatment when  appropriate.   I also included a review of serious risks of opioid use and substance use disorder.  I have included all of this in the after visit summary along with other risk factors identified that are pertinent to the patient today.      Follow Up:  Return in about 3 months (around 10/22/2025), or if symptoms worsen or fail to improve.     An After Visit Summary and PPPS with all of these plans were given to the patient.           ++++++++++++++++++++++++++++++++++++++++++++++++++++++++++++++++++   Additional E&M Note during same encounter follows:  Patient has multiple medical problems which are significant and separately identifiable that require additional work above and beyond the Medicare Wellness Visit.   Chief Complaint   Patient presents with    Medicare Wellness-subsequent    Diabetes    Hypertension    Hypothyroidism    Hyperlipidemia       HPI  History of Present Illness  The patient is a 69-year-old female with type 2 diabetes, hypothyroidism, coronary disease, hyperlipidemia, and hypertension, here for follow-up.    She reports experiencing lightheadedness, particularly when turning her head in certain directions, which also causes her to lose balance. She does not experience any sensation of spinning or vertigo. She has not noticed any episodes of low blood sugar during these episodes. Despite frequent blood sugar monitoring, she has not found any correlation between her blood sugar levels and her symptoms.     She has been managing her symptoms by reducing her sugar intake and resuming her regular metformin regimen. She has not taken Mounjaro due to concerns about potential side effects, including pancreatic cancer, as one of her sisters  from pancreatic cancer. She reports an improvement in her condition since returning to metformin 500 mg twice daily.    She is currently taking amlodipine for her blood pressure, administered at night before bed.    FAMILY HISTORY  One of her sisters   "from pancreatic cancer.    Review of Systems   Constitutional: Unexpected weight change: Weight gain or loss.   Cardiovascular:  Negative for chest pain, leg swelling and palpitations.   Respiratory:  Negative for shortness of breath and wheezing.    Musculoskeletal:  Negative for myalgias.   Gastrointestinal:  Negative for abdominal pain, nausea and vomiting.   Neurological:  Positive for dizziness. Negative for facial asymmetry, headaches, light-headedness, numbness, speech difficulty, syncope, tremors and weakness.       Social History     Tobacco Use    Smoking status: Former     Current packs/day: 0.00     Average packs/day: 1.5 packs/day for 46.0 years (69.0 ttl pk-yrs)     Types: Cigarettes     Start date: 1971     Quit date:      Years since quittin.5     Passive exposure: Past    Smokeless tobacco: Never   Substance Use Topics    Alcohol use: Never     O:   Vitals:    25 1312   BP: 132/64   Pulse: 77   SpO2: 97%   Weight: 66.7 kg (147 lb)   Height: 165.1 cm (65\")   PainSc: 0-No pain     Body mass index is 24.46 kg/m².  Vitals and nursing note reviewed.   Constitutional:       Appearance: Well-developed.   Eyes:      Extraocular Movements: Extraocular movements intact.      Pupils: Pupils are equal, round, and reactive to light.   HENT:      Head: Normocephalic and atraumatic.   Neck:      Thyroid: No thyromegaly.      Vascular: No JVD.   Pulmonary:      Effort: Pulmonary effort is normal. No respiratory distress.      Breath sounds: Normal breath sounds. No wheezing. No rales.   Cardiovascular:      Normal rate. Regular rhythm. Normal heart sounds.      No gallop.  No friction rub.   Abdominal:      General: Bowel sounds are normal. There is no distension.      Palpations: Abdomen is soft.      Tenderness: There is no abdominal tenderness. There is no guarding or rebound.   Musculoskeletal:      Cervical back: Neck supple. Skin:     General: Skin is warm and dry.   Neurological:      " Mental Status: Alert.      Cranial Nerves: No cranial nerve deficit.      Motor: No weakness or pronator drift. Normal muscle tone.      Coordination: Coordination normal. Finger-Nose-Finger Test and Heel to Shin Test normal. Rapid alternating movements normal.      Gait: Gait normal.      Deep Tendon Reflexes: Reflexes normal.      Comments: +Barbra Hallpike on right   Psychiatric:         Behavior: Behavior normal.       Results  Labs   - A1c: 06/16/2025, 8.9%   - A1c: 01/2025, 6.8%    Contains abnormal data BETA-HYDROXYBUTYRATE  Order: [id number]  Component  Ref Range & Units 1 mo ago   Beta-Hydroxybutyric Acid  0.0 - 0.3 mmol/L 0.5 High    Resulting Agency [facility]  ([id number])     Specimen Collected: 05/31/25 16:33    Performed by: [facility]  ([id number]) Last Resulted: 05/31/25 17:45   Received From: Located within [facility]  Result Received: 06/04/25 13:34     Diagnoses and all orders for this visit:    1. Medicare annual wellness visit, subsequent (Primary)    2. Type 2 diabetes mellitus with hyperglycemia, without long-term current use of insulin  -     CBC (No Diff)  -     Comprehensive Metabolic Panel  -     Lipid Panel  -     Hemoglobin A1c  -     TSH  -     Glutamic Acid Decarboxylase  -     C-Peptide  -     Insulin, Total  -     Beta-Hydroxybutyrate  -     Insulin Antibody  -     metFORMIN (GLUCOPHAGE) 500 MG tablet; Take 1 tablet by mouth 2 (Two) Times a Day With Meals.    3. Benign essential hypertension    4. Acquired hypothyroidism  -     TSH    5. Vitamin B12 deficiency  -     Vitamin B12    6. Vitamin D deficiency  -     Vitamin D,25-Hydroxy    7. Mixed hyperlipidemia    8. Coronary artery disease involving native coronary artery of native heart without angina pectoris    9. Benign paroxysmal positional vertigo of right ear      Assessment & Plan  1. Benign paroxysmal positional vertigo (BPPV).  - Symptoms of dizziness and lightheadedness, particularly when moving her head, are consistent with  BPPV.  - Neurological examination revealed nystagmus.  - Epley maneuver recommended for home practice, with instructions provided for the right ear.  - If symptoms persist, she should inform the clinic for a potential referral to physical therapy.    2. Type 2 diabetes mellitus.  - A1c level has increased from 6.8% in 01/2025 to 8.9% on 06/16/2025.  - Resumed taking metformin 500 mg twice a day.  - Fasting blood work will be ordered to assess insulin levels and insulin antibodies due to elevated BETA-HYDROXYBUTYRATE.  - Advised to continue monitoring blood sugar levels regularly.    3. Hypertension.  - Blood pressure is well-controlled with current medication regimen.  - Currently taking amlodipine at nighttime before bed.  4. -HLD - continue statin, recheck lipids.  5. -cad RF reduction, Lipid, BP and BS control.  6. -hypothyroidism - continue medication, recheck thyroid labs.  7. - Due check vitamin D and b12        Follow-up: The patient will follow up in 3 months.  Return in about 3 months (around 10/22/2025), or if symptoms worsen or fail to improve.      _____________________________________  Siddharth Lamas DO, MS    Current Outpatient Medications on File Prior to Visit   Medication Sig Dispense Refill    Alirocumab (Praluent) 75 MG/ML solution auto-injector Inject 1 mL under the skin into the appropriate area as directed Every 14 (Fourteen) Days. 6 mL 3    amitriptyline (ELAVIL) 10 MG tablet Take 1 tablet by mouth Daily.      amLODIPine (NORVASC) 2.5 MG tablet TAKE 3 TABLETS BY MOUTH EVERY NIGHT 90 tablet 0    aspirin 81 MG EC tablet Take 1 tablet by mouth Daily.      Calcium Carbonate Antacid (CALCIUM CARBONATE PO) tablet      Cholecalciferol (Vitamin D3) 50 MCG (2000 UT) capsule Take 1 capsule by mouth Daily. 90 capsule 0    dexlansoprazole (DEXILANT) 60 MG capsule Take 1 capsule by mouth Daily. 90 capsule 3    famotidine (PEPCID) 40 MG tablet Take 1 tablet by mouth Every Night. 90 tablet 3     isosorbide mononitrate (IMDUR) 60 MG 24 hr tablet Take 1 tablet by mouth Daily. 90 tablet 0    Lancets (onetouch ultrasoft) lancets Use one lancet twice daily to check blood sugars as discussed 100 each 12    levothyroxine (SYNTHROID, LEVOTHROID) 75 MCG tablet TAKE 1 TABLET BY MOUTH DAILY 30 tablet 5    Vitamin E 180 MG (400 UNIT) capsule capsule Take 1 capsule by mouth Daily. 90 capsule 1    [DISCONTINUED] metFORMIN ER (GLUCOPHAGE-XR) 500 MG 24 hr tablet Take 1 tablet by mouth Daily. 90 tablet 3    nitroglycerin (NITROSTAT) 0.4 MG SL tablet  (Patient not taking: Reported on 7/22/2025)      [DISCONTINUED] Tirzepatide 2.5 MG/0.5ML solution auto-injector Inject 2.5 mg under the skin into the appropriate area as directed 1 (One) Time Per Week. (Patient not taking: Reported on 7/22/2025) 2 mL 0     No current facility-administered medications on file prior to visit.

## 2025-07-22 NOTE — PATIENT INSTRUCTIONS
Advance Care Planning and Advance Directives     You make decisions on a daily basis - decisions about where you want to live, your career, your home, your life. Perhaps one of the most important decisions you face is your choice for future medical care. Take time to talk with your family and your healthcare team and start planning today.  Advance Care Planning is a process that can help you:  Understand possible future healthcare decisions in light of your own experiences  Reflect on those decision in light of your goals and values  Discuss your decisions with those closest to you and the healthcare professionals that care for you  Make a plan by creating a document that reflects your wishes    Surrogate Decision Maker  In the event of a medical emergency, which has left you unable to communicate or to make your own decisions, you would need someone to make decisions for you.  It is important to discuss your preferences for medical treatment with this person while you are in good health.     Qualities of a surrogate decision maker:  Willing to take on this role and responsibility  Knows what you want for future medical care  Willing to follow your wishes even if they don't agree with them  Able to make difficult medical decisions under stressful circumstances    Advance Directives  These are legal documents you can create that will guide your healthcare team and decision maker(s) when needed. These documents can be stored in the electronic medical record.    Living Will - a legal document to guide your care if you have a terminal condition or a serious illness and are unable to communicate. States vary by statute in document names/types, but most forms may include one or more of the following:        -  Directions regarding life-prolonging treatments        -  Directions regarding artificially provided nutrition/hydration        -  Choosing a healthcare decision maker        -  Direction regarding organ/tissue  donation    Durable Power of  for Healthcare - this document names an -in-fact to make medical decisions for you, but it may also allow this person to make personal and financial decisions for you. Please seek the advice of an  if you need this type of document.    **Advance Directives are not required and no one may discriminate against you if you do not sign one.    Medical Orders  Many states allow specific forms/orders signed by your physician to record your wishes for medical treatment in your current state of health. This form, signed in personal communication with your physician, addresses resuscitation and other medical interventions that you may or may not want.      For more information or to schedule a time with a [facility] Advance Care Planning Facilitator contact: [facility] or call [phone] and someone will contact you directly.Calorie Counting for Weight Loss  Calories are units of energy. Your body needs a certain number of calories from food to keep going throughout the day. When you eat or drink more calories than your body needs, your body stores the extra calories mostly as fat. When you eat or drink fewer calories than your body needs, your body burns fat to get the energy it needs.  Calorie counting means keeping track of how many calories you eat and drink each day. Calorie counting can be helpful if you need to lose weight. If you eat fewer calories than your body needs, you should lose weight. Ask your health care provider what a healthy weight is for you.  For calorie counting to work, you will need to eat the right number of calories each day to lose a healthy amount of weight per week. A dietitian can help you figure out how many calories you need in a day and will suggest ways to reach your calorie goal.  A healthy amount of weight to lose each week is usually 1-2 lb (0.5-0.9 kg). This usually means that your daily calorie intake should be  reduced by 500-750 calories.  Eating 1,200-1,500 calories a day can help most women lose weight.  Eating 1,500-1,800 calories a day can help most men lose weight.  What do I need to know about calorie counting?  Work with your health care provider or dietitian to determine how many calories you should get each day. To meet your daily calorie goal, you will need to:  Find out how many calories are in each food that you would like to eat. Try to do this before you eat.  Decide how much of the food you plan to eat.  Keep a food log. Do this by writing down what you ate and how many calories it had.  To successfully lose weight, it is important to balance calorie counting with a healthy lifestyle that includes regular activity.  Where do I find calorie information?  The number of calories in a food can be found on a Nutrition Facts label. If a food does not have a Nutrition Facts label, try to look up the calories online or ask your dietitian for help.  Remember that calories are listed per serving. If you choose to have more than one serving of a food, you will have to multiply the calories per serving by the number of servings you plan to eat. For example, the label on a package of bread might say that a serving size is 1 slice and that there are 90 calories in a serving. If you eat 1 slice, you will have eaten 90 calories. If you eat 2 slices, you will have eaten 180 calories.  How do I keep a food log?  After each time that you eat, record the following in your food log as soon as possible:  What you ate. Be sure to include toppings, sauces, and other extras on the food.  How much you ate. This can be measured in cups, ounces, or number of items.  How many calories were in each food and drink.  The total number of calories in the food you ate.  Keep your food log near you, such as in a pocket-sized notebook or on an junior or website on your mobile phone. Some programs will calculate calories for you and show you how  many calories you have left to meet your daily goal.  What are some portion-control tips?  Know how many calories are in a serving. This will help you know how many servings you can have of a certain food.  Use a measuring cup to measure serving sizes. You could also try weighing out portions on a kitchen scale. With time, you will be able to estimate serving sizes for some foods.  Take time to put servings of different foods on your favorite plates or in your favorite bowls and cups so you know what a serving looks like.  Try not to eat straight from a food's packaging, such as from a bag or box. Eating straight from the package makes it hard to see how much you are eating and can lead to overeating. Put the amount you would like to eat in a cup or on a plate to make sure you are eating the right portion.  Use smaller plates, glasses, and bowls for smaller portions and to prevent overeating.  Try not to multitask. For example, avoid watching TV or using your computer while eating. If it is time to eat, sit down at a table and enjoy your food. This will help you recognize when you are full. It will also help you be more mindful of what and how much you are eating.  What are tips for following this plan?  Reading food labels  Check the calorie count compared with the serving size. The serving size may be smaller than what you are used to eating.  Check the source of the calories. Try to choose foods that are high in protein, fiber, and vitamins, and low in saturated fat, trans fat, and sodium.  Shopping  Read nutrition labels while you shop. This will help you make healthy decisions about which foods to buy.  Pay attention to nutrition labels for low-fat or fat-free foods. These foods sometimes have the same number of calories or more calories than the full-fat versions. They also often have added sugar, starch, or salt to make up for flavor that was removed with the fat.  Make a grocery list of lower-calorie foods  and stick to it.  Cooking  Try to cook your favorite foods in a healthier way. For example, try baking instead of frying.  Use low-fat dairy products.  Meal planning  Use more fruits and vegetables. One-half of your plate should be fruits and vegetables.  Include lean proteins, such as chicken, turkey, and fish.  Lifestyle  Each week, aim to do one of the followin minutes of moderate exercise, such as walking.  75 minutes of vigorous exercise, such as running.  General information  Know how many calories are in the foods you eat most often. This will help you calculate calorie counts faster.  Find a way of tracking calories that works for you. Get creative. Try different apps or programs if writing down calories does not work for you.  What foods should I eat?    Eat nutritious foods. It is better to have a nutritious, high-calorie food, such as an avocado, than a food with few nutrients, such as a bag of potato chips.  Use your calories on foods and drinks that will fill you up and will not leave you hungry soon after eating.  Examples of foods that fill you up are nuts and nut butters, vegetables, lean proteins, and high-fiber foods such as whole grains. High-fiber foods are foods with more than 5 g of fiber per serving.  Pay attention to calories in drinks. Low-calorie drinks include water and unsweetened drinks.  The items listed above may not be a complete list of foods and beverages you can eat. Contact a dietitian for more information.  What foods should I limit?  Limit foods or drinks that are not good sources of vitamins, minerals, or protein or that are high in unhealthy fats. These include:  Candy.  Other sweets.  Sodas, specialty coffee drinks, alcohol, and juice.  The items listed above may not be a complete list of foods and beverages you should avoid. Contact a dietitian for more information.  How do I count calories when eating out?  Pay attention to portions. Often, portions are much larger  when eating out. Try these tips to keep portions smaller:  Consider sharing a meal instead of getting your own.  If you get your own meal, eat only half of it. Before you start eating, ask for a container and put half of your meal into it.  When available, consider ordering smaller portions from the menu instead of full portions.  Pay attention to your food and drink choices. Knowing the way food is cooked and what is included with the meal can help you eat fewer calories.  If calories are listed on the menu, choose the lower-calorie options.  Choose dishes that include vegetables, fruits, whole grains, low-fat dairy products, and lean proteins.  Choose items that are boiled, broiled, grilled, or steamed. Avoid items that are buttered, battered, fried, or served with cream sauce. Items labeled as crispy are usually fried, unless stated otherwise.  Choose water, low-fat milk, unsweetened iced tea, or other drinks without added sugar. If you want an alcoholic beverage, choose a lower-calorie option, such as a glass of wine or light beer.  Ask for dressings, sauces, and syrups on the side. These are usually high in calories, so you should limit the amount you eat.  If you want a salad, choose a garden salad and ask for grilled meats. Avoid extra toppings such as pickard, cheese, or fried items. Ask for the dressing on the side, or ask for olive oil and vinegar or lemon to use as dressing.  Estimate how many servings of a food you are given. Knowing serving sizes will help you be aware of how much food you are eating at restaurants.  Where to find more information  Centers for Disease Control and Prevention: www.cdc.gov  [place] Department of Agriculture: myplate.gov  Summary  Calorie counting means keeping track of how many calories you eat and drink each day. If you eat fewer calories than your body needs, you should lose weight.  A healthy amount of weight to lose per week is usually 1-2 lb (0.5-0.9 kg). This usually  means reducing your daily calorie intake by 500-750 calories.  The number of calories in a food can be found on a Nutrition Facts label. If a food does not have a Nutrition Facts label, try to look up the calories online or ask your dietitian for help.  Use smaller plates, glasses, and bowls for smaller portions and to prevent overeating.  Use your calories on foods and drinks that will fill you up and not leave you hungry shortly after a meal.  This information is not intended to replace advice given to you by your health care provider. Make sure you discuss any questions you have with your health care provider.  Document Revised: 01/28/2021 Document Reviewed: 01/28/2021  [employer] Patient Education © 2022 [employer]    Exercising to Lose Weight  Getting regular exercise is important for everyone. It is especially important if you are overweight. Being overweight increases your risk of heart disease, stroke, diabetes, high blood pressure, and several types of cancer. Exercising, and reducing the calories you consume, can help you lose weight and improve fitness and health.  Exercise can be moderate or vigorous intensity. To lose weight, most people need to do a certain amount of moderate or vigorous-intensity exercise each week.  How can exercise affect me?  You lose weight when you exercise enough to burn more calories than you eat. Exercise also reduces body fat and builds muscle. The more muscle you have, the more calories you burn. Exercise also:  Improves mood.  Reduces stress and tension.  Improves your overall fitness, flexibility, and endurance.  Increases bone strength.  Moderate-intensity exercise  Moderate-intensity exercise is any activity that gets you moving enough to burn at least three times more energy (calories) than if you were sitting.  Examples of moderate exercise include:  Walking a mile in 15 minutes.  Doing light yard work.  Biking at an easy pace.  Most people should get at least 150  minutes of moderate-intensity exercise a week to maintain their body weight.  Vigorous-intensity exercise  Vigorous-intensity exercise is any activity that gets you moving enough to burn at least six times more calories than if you were sitting. When you exercise at this intensity, you should be working hard enough that you are not able to carry on a conversation.  Examples of vigorous exercise include:  Running.  Playing a team sport, such as football, basketball, and soccer.  Jumping rope.  Most people should get at least 75 minutes a week of vigorous exercise to maintain their body weight.  What actions can I take to lose weight?  The amount of exercise you need to lose weight depends on:  Your age.  The type of exercise.  Any health conditions you have.  Your overall physical ability.  Talk to your health care provider about how much exercise you need and what types of activities are safe for you.  Nutrition    Make changes to your diet as told by your health care provider or diet and nutrition specialist (dietitian). This may include:  Eating fewer calories.  Eating more protein.  Eating less unhealthy fats.  Eating a diet that includes fresh fruits and vegetables, whole grains, low-fat dairy products, and lean protein.  Avoiding foods with added fat, salt, and sugar.  Drink plenty of water while you exercise to prevent dehydration or heat stroke.  Activity  Choose an activity that you enjoy and set realistic goals. Your health care provider can help you make an exercise plan that works for you.  Exercise at a moderate or vigorous intensity most days of the week.  The intensity of exercise may vary from person to person. You can tell how intense a workout is for you by paying attention to your breathing and heartbeat. Most people will notice their breathing and heartbeat get faster with more intense exercise.  Do resistance training twice each week, such as:  Push-ups.  Sit-ups.  Lifting weights.  Using  resistance bands.  Getting short amounts of exercise can be just as helpful as long, structured periods of exercise. If you have trouble finding time to exercise, try doing these things as part of your daily routine:  Get up, stretch, and walk around every 30 minutes throughout the day.  Go for a walk during your lunch break.  Park your car farther away from your destination.  If you take public transportation, get off one stop early and walk the rest of the way.  Make phone calls while standing up and walking around.  Take the stairs instead of elevators or escalators.  Wear comfortable clothes and shoes with good support.  Do not exercise so much that you hurt yourself, feel dizzy, or get very short of breath.  Where to find more information  [place] Department of Health and Human Services: www.hhs.gov  Centers for Disease Control and Prevention: www.cdc.gov  Contact a health care provider:  Before starting a new exercise program.  If you have questions or concerns about your weight.  If you have a medical problem that keeps you from exercising.  Get help right away if:  You have any of the following while exercising:  Injury.  Dizziness.  Difficulty breathing or shortness of breath that does not go away when you stop exercising.  Chest pain.  Rapid heartbeat.  These symptoms may represent a serious problem that is an emergency. Do not wait to see if the symptoms will go away. Get medical help right away. Call your local emergency services (911 in the [place] Do not drive yourself to the hospital.  Summary  Getting regular exercise is especially important if you are overweight.  Being overweight increases your risk of heart disease, stroke, diabetes, high blood pressure, and several types of cancer.  Losing weight happens when you burn more calories than you eat.  Reducing the amount of calories you eat, and getting regular moderate or vigorous exercise each week, helps you lose weight.  This information is not  intended to replace advice given to you by your health care provider. Make sure you discuss any questions you have with your health care provider.  Document Revised: 02/13/2022 Document Reviewed: 02/13/2022  [employer] Patient Education © 2022 [employer]

## 2025-08-11 LAB
25(OH)D3+25(OH)D2 SERPL-MCNC: 49.5 NG/ML (ref 30–100)
ALBUMIN SERPL-MCNC: 4.5 G/DL (ref 3.9–4.9)
ALP SERPL-CCNC: 108 IU/L (ref 44–121)
ALT SERPL-CCNC: 32 IU/L (ref 0–32)
AST SERPL-CCNC: 28 IU/L (ref 0–40)
B-OH-BUTYR SERPL-MCNC: 0.8 MG/DL
BILIRUB SERPL-MCNC: 0.4 MG/DL (ref 0–1.2)
BUN SERPL-MCNC: 9 MG/DL (ref 8–27)
BUN/CREAT SERPL: 9 (ref 12–28)
C PEPTIDE SERPL-MCNC: 6.4 NG/ML (ref 1.1–4.4)
CALCIUM SERPL-MCNC: 10 MG/DL (ref 8.7–10.3)
CHLORIDE SERPL-SCNC: 100 MMOL/L (ref 96–106)
CHOLEST SERPL-MCNC: 186 MG/DL (ref 100–199)
CO2 SERPL-SCNC: 24 MMOL/L (ref 20–29)
CREAT SERPL-MCNC: 0.99 MG/DL (ref 0.57–1)
EGFRCR SERPLBLD CKD-EPI 2021: 62 ML/MIN/1.73
ERYTHROCYTE [DISTWIDTH] IN BLOOD BY AUTOMATED COUNT: 13.7 % (ref 11.7–15.4)
GAD65 AB SER IA-ACNC: <5 U/ML (ref 0–5)
GLOBULIN SER CALC-MCNC: 2.4 G/DL (ref 1.5–4.5)
GLUCOSE SERPL-MCNC: 132 MG/DL (ref 70–99)
HBA1C MFR BLD: 7.6 % (ref 4.8–5.6)
HCT VFR BLD AUTO: 40.7 % (ref 34–46.6)
HDLC SERPL-MCNC: 61 MG/DL
HGB BLD-MCNC: 12.6 G/DL (ref 11.1–15.9)
INSULIN AB SER-ACNC: <5 UU/ML
INSULIN SERPL-ACNC: 25.5 UIU/ML (ref 2.6–24.9)
LDLC SERPL CALC-MCNC: 94 MG/DL (ref 0–99)
MCH RBC QN AUTO: 26.6 PG (ref 26.6–33)
MCHC RBC AUTO-ENTMCNC: 31 G/DL (ref 31.5–35.7)
MCV RBC AUTO: 86 FL (ref 79–97)
PLATELET # BLD AUTO: 250 X10E3/UL (ref 150–450)
POTASSIUM SERPL-SCNC: 4.2 MMOL/L (ref 3.5–5.2)
PROT SERPL-MCNC: 6.9 G/DL (ref 6–8.5)
RBC # BLD AUTO: 4.73 X10E6/UL (ref 3.77–5.28)
SODIUM SERPL-SCNC: 141 MMOL/L (ref 134–144)
TRIGL SERPL-MCNC: 184 MG/DL (ref 0–149)
TSH SERPL DL<=0.005 MIU/L-ACNC: 1.45 UIU/ML (ref 0.45–4.5)
VIT B12 SERPL-MCNC: 193 PG/ML (ref 232–1245)
VLDLC SERPL CALC-MCNC: 31 MG/DL (ref 5–40)
WBC # BLD AUTO: 6.2 X10E3/UL (ref 3.4–10.8)

## 2025-08-13 DIAGNOSIS — E11.65 TYPE 2 DIABETES MELLITUS WITH HYPERGLYCEMIA, WITHOUT LONG-TERM CURRENT USE OF INSULIN: ICD-10-CM
